# Patient Record
Sex: FEMALE | Race: BLACK OR AFRICAN AMERICAN | HISPANIC OR LATINO | URBAN - METROPOLITAN AREA
[De-identification: names, ages, dates, MRNs, and addresses within clinical notes are randomized per-mention and may not be internally consistent; named-entity substitution may affect disease eponyms.]

---

## 2020-01-17 ENCOUNTER — EMERGENCY (EMERGENCY)
Facility: HOSPITAL | Age: 35
LOS: 1 days | Discharge: HOME | End: 2020-01-17
Attending: EMERGENCY MEDICINE | Admitting: EMERGENCY MEDICINE
Payer: MEDICAID

## 2020-01-17 VITALS
HEART RATE: 64 BPM | RESPIRATION RATE: 18 BRPM | TEMPERATURE: 98 F | SYSTOLIC BLOOD PRESSURE: 156 MMHG | DIASTOLIC BLOOD PRESSURE: 81 MMHG | OXYGEN SATURATION: 100 %

## 2020-01-17 PROCEDURE — 99283 EMERGENCY DEPT VISIT LOW MDM: CPT

## 2020-01-17 PROCEDURE — 73620 X-RAY EXAM OF FOOT: CPT | Mod: 26,LT

## 2020-01-17 PROCEDURE — 99053 MED SERV 10PM-8AM 24 HR FAC: CPT

## 2020-01-17 RX ORDER — DIAZEPAM 5 MG
5 TABLET ORAL ONCE
Refills: 0 | Status: DISCONTINUED | OUTPATIENT
Start: 2020-01-17 | End: 2020-01-17

## 2020-01-17 RX ORDER — IBUPROFEN 200 MG
600 TABLET ORAL ONCE
Refills: 0 | Status: COMPLETED | OUTPATIENT
Start: 2020-01-17 | End: 2020-01-17

## 2020-01-17 RX ADMIN — Medication 600 MILLIGRAM(S): at 08:51

## 2020-01-17 RX ADMIN — Medication 5 MILLIGRAM(S): at 08:50

## 2020-01-17 RX ADMIN — Medication 600 MILLIGRAM(S): at 10:19

## 2020-01-17 NOTE — ED PROVIDER NOTE - MUSCULOSKELETAL MINIMAL EXAM
Midline neck and back nontender. No step offs. No crepitus. Pelvis stable. +b/l lumbosacral paraspinal muscle TTP and spasm.
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
## 2020-01-17 NOTE — ED PROVIDER NOTE - EXTREMITY EXAM
Lower extremities normal appearing. No TTP throughout R foot and R ankle. No L ankle TTP, however TTP over L heel. Strength 5/5 in all extremities. Gait intact but with obvious pain in L foot when walking.

## 2020-01-17 NOTE — ED PROVIDER NOTE - PROGRESS NOTE DETAILS
Kellie Tobar PGY1: Pt seen and evaluated by me. Pt with trapezius hypertonicity b/l with ttp, T/L paraspinal hypertonicity. no C/T/L midline ttp. ttp of the left heel, able to ambulate. no bruising or redness. Pending XR. Will reassess pain following medications.
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
## 2020-01-17 NOTE — ED PROVIDER NOTE - PATIENT PORTAL LINK FT
You can access the FollowMyHealth Patient Portal offered by [facility] by registering at the following website: [email or web address]. By joining [facility]’s FollowMyHealth portal, you will also be able to view your health information using other applications (apps) compatible with our system.
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
## 2020-01-17 NOTE — ED PROVIDER NOTE - NSFOLLOWUPINSTRUCTIONS_ED_ALL_ED_FT
you were seen in the emergency department today for neck pain and ankle pain.     you had an xray of your left foot done which didn't show any evidence of fracture.     You can take ibuprofen 600mg or tylenol 650mg every 6 hours at home for pain as needed.     follow up with your primary care doctor in the next sever days.     Return to the emergency department if you develop any new or worsening symptoms including but not limited to the following; severe/worsening pain, difficulty walking, numbness/tingling.

## 2020-03-08 ENCOUNTER — INPATIENT (INPATIENT)
Facility: HOSPITAL | Age: 35
LOS: 4 days | Discharge: HOME | End: 2020-03-13
Attending: PSYCHIATRY & NEUROLOGY | Admitting: PSYCHIATRY & NEUROLOGY
Payer: SELF-PAY

## 2020-03-08 VITALS
HEART RATE: 78 BPM | RESPIRATION RATE: 18 BRPM | TEMPERATURE: 98 F | DIASTOLIC BLOOD PRESSURE: 98 MMHG | OXYGEN SATURATION: 100 % | SYSTOLIC BLOOD PRESSURE: 148 MMHG

## 2020-03-08 LAB
ALBUMIN SERPL ELPH-MCNC: 4.3 G/DL — SIGNIFICANT CHANGE UP (ref 3.3–5)
ALP SERPL-CCNC: 56 U/L — SIGNIFICANT CHANGE UP (ref 40–120)
ALT FLD-CCNC: 24 U/L — SIGNIFICANT CHANGE UP (ref 4–33)
ANION GAP SERPL CALC-SCNC: 17 MMO/L — HIGH (ref 7–14)
APAP SERPL-MCNC: < 15 UG/ML — LOW (ref 15–25)
AST SERPL-CCNC: 23 U/L — SIGNIFICANT CHANGE UP (ref 4–32)
BASOPHILS # BLD AUTO: 0.05 K/UL — SIGNIFICANT CHANGE UP (ref 0–0.2)
BASOPHILS NFR BLD AUTO: 0.4 % — SIGNIFICANT CHANGE UP (ref 0–2)
BILIRUB SERPL-MCNC: 0.6 MG/DL — SIGNIFICANT CHANGE UP (ref 0.2–1.2)
BUN SERPL-MCNC: 12 MG/DL — SIGNIFICANT CHANGE UP (ref 7–23)
CALCIUM SERPL-MCNC: 9.7 MG/DL — SIGNIFICANT CHANGE UP (ref 8.4–10.5)
CHLORIDE SERPL-SCNC: 103 MMOL/L — SIGNIFICANT CHANGE UP (ref 98–107)
CO2 SERPL-SCNC: 20 MMOL/L — LOW (ref 22–31)
CREAT SERPL-MCNC: 0.83 MG/DL — SIGNIFICANT CHANGE UP (ref 0.5–1.3)
EOSINOPHIL # BLD AUTO: 0.04 K/UL — SIGNIFICANT CHANGE UP (ref 0–0.5)
EOSINOPHIL NFR BLD AUTO: 0.4 % — SIGNIFICANT CHANGE UP (ref 0–6)
ETHANOL BLD-MCNC: < 10 MG/DL — SIGNIFICANT CHANGE UP
GLUCOSE SERPL-MCNC: 82 MG/DL — SIGNIFICANT CHANGE UP (ref 70–99)
HCT VFR BLD CALC: 39.9 % — SIGNIFICANT CHANGE UP (ref 34.5–45)
HGB BLD-MCNC: 12.6 G/DL — SIGNIFICANT CHANGE UP (ref 11.5–15.5)
IMM GRANULOCYTES NFR BLD AUTO: 0.2 % — SIGNIFICANT CHANGE UP (ref 0–1.5)
LYMPHOCYTES # BLD AUTO: 2.25 K/UL — SIGNIFICANT CHANGE UP (ref 1–3.3)
LYMPHOCYTES # BLD AUTO: 20.1 % — SIGNIFICANT CHANGE UP (ref 13–44)
MCHC RBC-ENTMCNC: 25.5 PG — LOW (ref 27–34)
MCHC RBC-ENTMCNC: 31.6 % — LOW (ref 32–36)
MCV RBC AUTO: 80.6 FL — SIGNIFICANT CHANGE UP (ref 80–100)
MONOCYTES # BLD AUTO: 0.82 K/UL — SIGNIFICANT CHANGE UP (ref 0–0.9)
MONOCYTES NFR BLD AUTO: 7.3 % — SIGNIFICANT CHANGE UP (ref 2–14)
NEUTROPHILS # BLD AUTO: 8.03 K/UL — HIGH (ref 1.8–7.4)
NEUTROPHILS NFR BLD AUTO: 71.6 % — SIGNIFICANT CHANGE UP (ref 43–77)
NRBC # FLD: 0 K/UL — SIGNIFICANT CHANGE UP (ref 0–0)
PLATELET # BLD AUTO: 452 K/UL — HIGH (ref 150–400)
PMV BLD: 10.1 FL — SIGNIFICANT CHANGE UP (ref 7–13)
POTASSIUM SERPL-MCNC: 3.7 MMOL/L — SIGNIFICANT CHANGE UP (ref 3.5–5.3)
POTASSIUM SERPL-SCNC: 3.7 MMOL/L — SIGNIFICANT CHANGE UP (ref 3.5–5.3)
PROT SERPL-MCNC: 8.6 G/DL — HIGH (ref 6–8.3)
RBC # BLD: 4.95 M/UL — SIGNIFICANT CHANGE UP (ref 3.8–5.2)
RBC # FLD: 16.3 % — HIGH (ref 10.3–14.5)
SALICYLATES SERPL-MCNC: < 5 MG/DL — LOW (ref 15–30)
SODIUM SERPL-SCNC: 140 MMOL/L — SIGNIFICANT CHANGE UP (ref 135–145)
TSH SERPL-MCNC: 2.8 UIU/ML — SIGNIFICANT CHANGE UP (ref 0.27–4.2)
WBC # BLD: 11.21 K/UL — HIGH (ref 3.8–10.5)
WBC # FLD AUTO: 11.21 K/UL — HIGH (ref 3.8–10.5)

## 2020-03-08 PROCEDURE — 99285 EMERGENCY DEPT VISIT HI MDM: CPT

## 2020-03-08 NOTE — ED BEHAVIORAL HEALTH ASSESSMENT NOTE - SUMMARY
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 No drug hx, no etoh use or tobacco use, no hx of violence or arrests. Pt BIB by EMS for bizarre behavior and confusion. 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 No drug hx, no etoh use or tobacco use, no hx of violence or arrests. Pt BIB by EMS for bizarre behavior and confusion.    Patient presents with berna with psychotic sx. Pt has poor insight into her symptoms and has been exercising poor judgement  and not able to care for herself , due to acute manic and psychotic sx . Pt requires inpatient hospitalization for stabilization and safety concerns.

## 2020-03-08 NOTE — ED BEHAVIORAL HEALTH ASSESSMENT NOTE - SUICIDE PROTECTIVE FACTORS
Engaged in work or school/Responsibility to family and others/Identifies reasons for living/Has future plans/Supportive social network of family or friends

## 2020-03-08 NOTE — ED BEHAVIORAL HEALTH ASSESSMENT NOTE - DESCRIPTION
calm, cooperative  Vital Signs Last 24 Hrs  T(C): 36.4 (08 Mar 2020 19:42), Max: 36.4 (08 Mar 2020 19:42)  T(F): 97.5 (08 Mar 2020 19:42), Max: 97.5 (08 Mar 2020 19:42)  HR: 78 (08 Mar 2020 19:42) (78 - 78)  BP: 148/98 (08 Mar 2020 19:42) (148/98 - 148/98)  BP(mean): --  RR: 18 (08 Mar 2020 19:42) (18 - 18)  SpO2: 100% (08 Mar 2020 19:42) (100% - 100%) as as per hpi

## 2020-03-08 NOTE — ED ADULT TRIAGE NOTE - CHIEF COMPLAINT QUOTE
pt arrives ems who [facility] called by bystander who stated pt was lying on top of car. pt arrives with inappropriate responses. stating " I was doing yoga,whats wrong with that. ". gives incorrect date with laughing.  fs 78 by ems pt arrives ems who pt was [facility] called by bystander who stated pt was lying on top of car. pt arrives with inappropriate responses. stating " I was doing yoga,whats wrong with that. ". gives incorrect date with laughing.  fs 78 by ems  fs 81 on arrival pt arrives ems who pt was found standing In front of [employer] ringing bell. called by bystander who stated pt was lying on top of car. pt arrives with inappropriate responses. stating " I was doing yoga,whats wrong with that. ". gives incorrect date with laughing.  fs 78 by ems  fs 81 on arrival . fit to evaluate. for

## 2020-03-08 NOTE — ED ADULT NURSE NOTE - OBJECTIVE STATEMENT
Pt belongings checked and secured by staff.  Pt checked by metal detector.  Pt changed into gown and scrubs.  Pt brought in to ED found sitting on a strangers car in front of a [employer] in her pajamas "doing yoga."  Pt believes it is 1985.  Pt is cooperative with staff and polite in her exchanges.  Labs drawn and sent.  Pt yet to provide urine.  Pt denies S/I, H/I.  Pt awaiting Psych consult.

## 2020-03-08 NOTE — ED ADULT NURSE NOTE - CHIEF COMPLAINT QUOTE
pt arrives ems who pt was found standing In front of [employer] ringing bell. called by bystander who stated pt was lying on top of car. pt arrives with inappropriate responses. stating " I was doing yoga,whats wrong with that. ". gives incorrect date with laughing.  fs 78 by ems  fs 81 on arrival . fit to evaluate. for

## 2020-03-08 NOTE — ED PROVIDER NOTE - NS ED ROS FT
Constitutional: No fever. no chills.   Eyes: No visual changes  HEENT: No throat pain  CV: No chest pain, no palpitations  Resp: No shortness of breath, no cough  GI: No abdominal pain. No nausea. no  vomiting.   : No dysuria, hematuria.   MSK: No musculoskeletal pain  Skin: No rash  Neuro: No headache. No numbness or tingling. No weakness.   Allergy/Immunology: no allergy to medicine or food.

## 2020-03-08 NOTE — ED BEHAVIORAL HEALTH ASSESSMENT NOTE - HPI (INCLUDE ILLNESS QUALITY, SEVERITY, DURATION, TIMING, CONTEXT, MODIFYING FACTORS, ASSOCIATED SIGNS AND SYMPTOMS)
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 No drug hx, no etoh use or tobacco use, no hx of violence or arrests. Pt BIB by EMS for bizarre behavior and confusion.    On assessment, pt is calm cooperative, did not require any prns in ED. Patient  is oriented to self only, reports she is in" heaven" and reports today's date as 1985. When the writer points out the date she endorses is her birthday she states her birthday just passed in February and it was on Valentine's day, she also states today is Friday.  When informing that it's Sunday she is surprised  stating "wow , I've been sleeping for a while ". However later reports poor sleep , but also says she has been sleeping 30 hrs. She reports living with her mother and her triplet sisters and her father upstairs (this is not the case , pt only has one sister , Lakshmi and the father is estranged from the family). Pt reports she was going for a walk today because 'it gives me clarity ", when one of the neighbors thought it was "peculiar " and called 911. Patient is religiously preoccupied endorsing "God had given me a talent", and that she has been going out and thinking how she cant put this talent to purpose . She states she has a lot of creative ideas and has been spending  a lot fo time in her art studio , "I see different colors, and I have a lot of creative ideas" . She says she came up with the conclusion that she wants to be a mother and have met this person whom she claims she will build life with. She also states she has introduced him to the family and that was a big deal (as per the sister , pt is not involved with anyone and has not brought anyone home ). She refers to herself as the child of Gd, and that she has been communicating to him directly and he has informed her "that I am enough to be his daughter". "Gd is everywhere, in me, in trees ." She reports she has been hearing the voice of Asiya Sotelo, telling her she is enough to be  Gd;s daughter".  She denies hearing any other voices . Denies vh.     She describes her mood as "calm", denies feeling depressed or anxious. She is endorsing low energy level "because my feet are hurting". She denies any issues with concentration, denies feeling hopeless, deneis any si/hi/i/p. She  is reporting she is thinking a lot , having racing thoughts, having decrease need for sleep. She denies excessive/increased  spending money , denies impulsivity.     as per the sister, Lakshmi [phone]. pt lives the mother . The sister reports she was not aware pt is in the hospital. Patient has no psychiatric hx , never been hospitalizations, never treated .Pt left the house at 4:00am today and has been missing since.  Pt has been having episodes of confusion started this past Tuesday, lost her wallet, lost her car keys . Patient has been forgetful , would say things that did something but she did not, for example says she put a cup in the sink , but there is no cup in the sink . She has been saying that she needs to meditate and go out late at night and returns by morning time . She has been leaving the house in the middle of the night this week  and not returning until morning. Pt has been wondering the streets of the neighborhood. Pt explained to her sister, "I need me time" , and has expressed to her sister in the past month that she feels lost, and feels she does not know what direction to go. The sister noticed that pt has been having on and off mood swings "high and lows".  Patient has not been sleeping well, up all night and during the day. Pt's appetite has been low, and she lost 10lbs . Patient has been neglectful with the dogs, have not been taking care of them .  The sister denies  pt expressed any si recently or int he past denies any psychotic sx .Pt's aunts passed away recently ,  who was very close to the pt and she suspects maybe it has affected the patient.  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 No family hx of psychiatric illness. As per the sister pt does not use drugs or smokes cigarettes, and denies pt drinks ETOH . Pt graduated from [employer] this semester , studied criminal justice . No possessions of guns, no hx of aggression or violence.

## 2020-03-08 NOTE — ED BEHAVIORAL HEALTH ASSESSMENT NOTE - RISK ASSESSMENT
Low Acute Suicide Risk pt is at elevated risk given acute mood sx and psychotic sx. Risk factors include active mood sx, active psychotic sx, , not in treatment. Risk factors will be mitigated by hospitalizations.  Patient has protective factors including family support, no hx of sa. no substance use.

## 2020-03-08 NOTE — ED BEHAVIORAL HEALTH ASSESSMENT NOTE - PSYCHIATRIC ISSUES AND PLAN (INCLUDE STANDING AND PRN MEDICATION)
start Abilify 5mg start Abilify 5mg qam and Klonopin 1mg qhs . For agitation : Haldol 2mg and Ativan 2mg po/im q6hrs prn

## 2020-03-08 NOTE — ED PROVIDER NOTE - OBJECTIVE STATEMENT
35 yo F no known PMH BIBEMS after bystanders called that she was acting odd on top of a parked car. Patient reports was doing "downward dog" and praying in front of the [employer] near her house. Patient noted not to have shoes or socks states she walked from her house to [employer] without any shoes. patient denies si, hi. patient denies drugs, etoh use.  pt gave number for mother Lindsay Rojas [phone], but number not working.

## 2020-03-08 NOTE — ED PROVIDER NOTE - CLINICAL SUMMARY MEDICAL DECISION MAKING FREE TEXT BOX
abnormal behavior aaox1. no reoprted psychosis in past visits in EMR. pt denies substance abuse. possible new psychosis. plan: labs psych eval.

## 2020-03-08 NOTE — ED PROVIDER NOTE - PHYSICAL EXAMINATION
GEN: no acute respiratory distress. nontoxic, speaking comfortably in full sentences, ambulating with steady gait.  HEENT: NCAT. face symmetrical. PERRL 4mm, EOMI, MMM, oropharynx wnl.  Neck: no JVD, trachea midline, no LAD, FROM  CV: RRR. +S1S2, no murmur. cap refill <2 sec  Chest: CTA B/l. no wheezing, rales, rhonchi. no retractions. good air movement.   ABD: +BS, soft, non distended, non tender.   : no cva or suprapubic tenderness  MSK: No clubbing, cyanosis, edema. FROM of all extremities. no tenderness to palpation. No midline or paraspinal tenderness.   Neuro: AAOX1 (patient believes she is in a [place] and it is 1985).Sensation intact, motor 5/5 throughout. no ataxia  SKIN: No rash

## 2020-03-08 NOTE — ED BEHAVIORAL HEALTH NOTE - BEHAVIORAL HEALTH NOTE
as per the sister, Lakshmi Villatoro [phone]. pt lives the mother . The sister reports she was not aware pt is in the hospital. Patient has no psychiatric hx , never been hospitalizations, never treated .Pt left the house at 4:00am today and has been missing since.  Pt has been having episodes of confusion started this past Tuesday, lost her wallet, lost her car keys . Patient has been forgetful , would say things that did something but she did not, for example says she put a cup in the sink , but there is no cup in the sink . She has been saying that she needs to meditate and go out late at night and returns by morning time . She has been leaving the house in the middle of the night this week  and not returning until morning. Pt has been wondering the streets of the neighborhood. Pt explained to her sister, "I need me time" , and has expressed to her sister in the past month that she feels lost, and feels she does not know what direction to go. The sister noticed that pt has been having on and off mood swings "high and lows".  Patient has not been sleeping well, up all night and during the day. Pt's appetite has been low, and she lost 10lbs . Patient has been neglectful with the dogs, have not been taking care of them .  The sister denies  pt expressed any si recently or int he past denies any psychotic sx .Pt's aunts passed away recently ,  who was very close to the pt and she suspects maybe it has affected the patient.  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 No family hx of psychiatric illness. As per the sister pt does not use drugs or smokes cigarettes, and denies pt drinks ETOH . Pt graduated from [employer] this semester , studied criminal justice . No possessions of guns, no hx of aggression or violence.

## 2020-03-09 DIAGNOSIS — F31.2 BIPOLAR DISORDER, CURRENT EPISODE MANIC SEVERE WITH PSYCHOTIC FEATURES: ICD-10-CM

## 2020-03-09 DIAGNOSIS — R46.89 OTHER SYMPTOMS AND SIGNS INVOLVING APPEARANCE AND BEHAVIOR: ICD-10-CM

## 2020-03-09 LAB
AMPHET UR-MCNC: NEGATIVE — SIGNIFICANT CHANGE UP
APPEARANCE UR: SIGNIFICANT CHANGE UP
BACTERIA # UR AUTO: SIGNIFICANT CHANGE UP
BARBITURATES UR SCN-MCNC: NEGATIVE — SIGNIFICANT CHANGE UP
BENZODIAZ UR-MCNC: NEGATIVE — SIGNIFICANT CHANGE UP
BILIRUB UR-MCNC: NEGATIVE — SIGNIFICANT CHANGE UP
BLOOD UR QL VISUAL: SIGNIFICANT CHANGE UP
CANNABINOIDS UR-MCNC: NEGATIVE — SIGNIFICANT CHANGE UP
COCAINE METAB.OTHER UR-MCNC: NEGATIVE — SIGNIFICANT CHANGE UP
COLOR SPEC: YELLOW — SIGNIFICANT CHANGE UP
GLUCOSE UR-MCNC: NEGATIVE — SIGNIFICANT CHANGE UP
KETONES UR-MCNC: HIGH
LEUKOCYTE ESTERASE UR-ACNC: NEGATIVE — SIGNIFICANT CHANGE UP
METHADONE UR-MCNC: NEGATIVE — SIGNIFICANT CHANGE UP
NITRITE UR-MCNC: NEGATIVE — SIGNIFICANT CHANGE UP
OPIATES UR-MCNC: NEGATIVE — SIGNIFICANT CHANGE UP
OXYCODONE UR-MCNC: NEGATIVE — SIGNIFICANT CHANGE UP
PCP UR-MCNC: NEGATIVE — SIGNIFICANT CHANGE UP
PH UR: 5.5 — SIGNIFICANT CHANGE UP (ref 5–8)
PROT UR-MCNC: 20 — SIGNIFICANT CHANGE UP
RBC CASTS # UR COMP ASSIST: SIGNIFICANT CHANGE UP (ref 0–?)
SP GR SPEC: 1.02 — SIGNIFICANT CHANGE UP (ref 1–1.04)
SQUAMOUS # UR AUTO: SIGNIFICANT CHANGE UP
UROBILINOGEN FLD QL: NORMAL — SIGNIFICANT CHANGE UP
WBC UR QL: SIGNIFICANT CHANGE UP (ref 0–?)

## 2020-03-09 PROCEDURE — 99222 1ST HOSP IP/OBS MODERATE 55: CPT

## 2020-03-09 PROCEDURE — 70450 CT HEAD/BRAIN W/O DYE: CPT | Mod: 26

## 2020-03-09 RX ORDER — ARIPIPRAZOLE 15 MG/1
5 TABLET ORAL DAILY
Refills: 0 | Status: DISCONTINUED | OUTPATIENT
Start: 2020-03-09 | End: 2020-03-09

## 2020-03-09 RX ORDER — ARIPIPRAZOLE 15 MG/1
5 TABLET ORAL AT BEDTIME
Refills: 0 | Status: DISCONTINUED | OUTPATIENT
Start: 2020-03-09 | End: 2020-03-13

## 2020-03-09 RX ORDER — DIPHENHYDRAMINE HCL 50 MG
50 CAPSULE ORAL EVERY 6 HOURS
Refills: 0 | Status: DISCONTINUED | OUTPATIENT
Start: 2020-03-09 | End: 2020-03-13

## 2020-03-09 RX ORDER — HALOPERIDOL DECANOATE 100 MG/ML
2 INJECTION INTRAMUSCULAR EVERY 6 HOURS
Refills: 0 | Status: DISCONTINUED | OUTPATIENT
Start: 2020-03-09 | End: 2020-03-13

## 2020-03-09 RX ORDER — CLONAZEPAM 1 MG
1 TABLET ORAL AT BEDTIME
Refills: 0 | Status: DISCONTINUED | OUTPATIENT
Start: 2020-03-09 | End: 2020-03-13

## 2020-03-09 RX ADMIN — HALOPERIDOL DECANOATE 2 MILLIGRAM(S): 100 INJECTION INTRAMUSCULAR at 20:24

## 2020-03-09 RX ADMIN — HALOPERIDOL DECANOATE 2 MILLIGRAM(S): 100 INJECTION INTRAMUSCULAR at 06:09

## 2020-03-09 RX ADMIN — ARIPIPRAZOLE 5 MILLIGRAM(S): 15 TABLET ORAL at 20:23

## 2020-03-09 RX ADMIN — Medication 1 MILLIGRAM(S): at 20:23

## 2020-03-09 RX ADMIN — Medication 50 MILLIGRAM(S): at 20:24

## 2020-03-09 RX ADMIN — Medication 2 MILLIGRAM(S): at 06:09

## 2020-03-09 NOTE — ED BEHAVIORAL HEALTH NOTE - BEHAVIORAL HEALTH NOTE
FREDO notified that pt is being admitted to the hospital; assigned to [facility].  FREDO contacted pt's sister, Lakshmi, and informed her that pt is going to [facility]; FREDO provided pt's sister with contact number to the unit.

## 2020-03-10 PROCEDURE — 99232 SBSQ HOSP IP/OBS MODERATE 35: CPT

## 2020-03-10 RX ADMIN — ARIPIPRAZOLE 5 MILLIGRAM(S): 15 TABLET ORAL at 20:49

## 2020-03-10 RX ADMIN — Medication 1 MILLIGRAM(S): at 20:49

## 2020-03-11 PROCEDURE — 99232 SBSQ HOSP IP/OBS MODERATE 35: CPT

## 2020-03-11 RX ADMIN — Medication 1 MILLIGRAM(S): at 20:28

## 2020-03-11 RX ADMIN — ARIPIPRAZOLE 5 MILLIGRAM(S): 15 TABLET ORAL at 20:28

## 2020-03-12 PROCEDURE — 99232 SBSQ HOSP IP/OBS MODERATE 35: CPT

## 2020-03-12 RX ADMIN — Medication 1 MILLIGRAM(S): at 20:59

## 2020-03-12 RX ADMIN — ARIPIPRAZOLE 5 MILLIGRAM(S): 15 TABLET ORAL at 20:59

## 2020-03-13 VITALS — HEART RATE: 76 BPM | DIASTOLIC BLOOD PRESSURE: 71 MMHG | SYSTOLIC BLOOD PRESSURE: 123 MMHG | TEMPERATURE: 99 F

## 2020-03-13 PROCEDURE — 99238 HOSP IP/OBS DSCHRG MGMT 30/<: CPT

## 2020-03-13 RX ORDER — ARIPIPRAZOLE 15 MG/1
1 TABLET ORAL
Qty: 15 | Refills: 0
Start: 2020-03-13

## 2021-03-12 ENCOUNTER — INPATIENT (INPATIENT)
Facility: HOSPITAL | Age: 36
LOS: 6 days | Discharge: ANOTHER HOSPITAL | End: 2021-03-19
Attending: STUDENT IN AN ORGANIZED HEALTH CARE EDUCATION/TRAINING PROGRAM | Admitting: STUDENT IN AN ORGANIZED HEALTH CARE EDUCATION/TRAINING PROGRAM
Payer: MEDICAID

## 2021-03-12 VITALS — HEIGHT: 62 IN

## 2021-03-12 LAB
ALBUMIN SERPL ELPH-MCNC: 4.1 G/DL — SIGNIFICANT CHANGE UP (ref 3.3–5)
ALP SERPL-CCNC: 63 U/L — SIGNIFICANT CHANGE UP (ref 40–120)
ALT FLD-CCNC: 20 U/L — SIGNIFICANT CHANGE UP (ref 4–33)
ANION GAP SERPL CALC-SCNC: 12 MMOL/L — SIGNIFICANT CHANGE UP (ref 7–14)
APPEARANCE UR: CLEAR — SIGNIFICANT CHANGE UP
AST SERPL-CCNC: 22 U/L — SIGNIFICANT CHANGE UP (ref 4–32)
BASOPHILS # BLD AUTO: 0.03 K/UL — SIGNIFICANT CHANGE UP (ref 0–0.2)
BASOPHILS NFR BLD AUTO: 0.3 % — SIGNIFICANT CHANGE UP (ref 0–2)
BILIRUB SERPL-MCNC: 0.4 MG/DL — SIGNIFICANT CHANGE UP (ref 0.2–1.2)
BILIRUB UR-MCNC: NEGATIVE — SIGNIFICANT CHANGE UP
BUN SERPL-MCNC: 11 MG/DL — SIGNIFICANT CHANGE UP (ref 7–23)
CALCIUM SERPL-MCNC: 9.4 MG/DL — SIGNIFICANT CHANGE UP (ref 8.4–10.5)
CHLORIDE SERPL-SCNC: 103 MMOL/L — SIGNIFICANT CHANGE UP (ref 98–107)
CO2 SERPL-SCNC: 22 MMOL/L — SIGNIFICANT CHANGE UP (ref 22–31)
COLOR SPEC: SIGNIFICANT CHANGE UP
CREAT SERPL-MCNC: 0.86 MG/DL — SIGNIFICANT CHANGE UP (ref 0.5–1.3)
DIFF PNL FLD: ABNORMAL
EOSINOPHIL # BLD AUTO: 0.08 K/UL — SIGNIFICANT CHANGE UP (ref 0–0.5)
EOSINOPHIL NFR BLD AUTO: 0.8 % — SIGNIFICANT CHANGE UP (ref 0–6)
GLUCOSE SERPL-MCNC: 93 MG/DL — SIGNIFICANT CHANGE UP (ref 70–99)
GLUCOSE UR QL: NEGATIVE — SIGNIFICANT CHANGE UP
HCG SERPL-ACNC: <5 MIU/ML — SIGNIFICANT CHANGE UP
HCT VFR BLD CALC: 36.4 % — SIGNIFICANT CHANGE UP (ref 34.5–45)
HGB BLD-MCNC: 11.6 G/DL — SIGNIFICANT CHANGE UP (ref 11.5–15.5)
IANC: 7.48 K/UL — SIGNIFICANT CHANGE UP (ref 1.5–8.5)
IMM GRANULOCYTES NFR BLD AUTO: 0.3 % — SIGNIFICANT CHANGE UP (ref 0–1.5)
KETONES UR-MCNC: ABNORMAL
LEUKOCYTE ESTERASE UR-ACNC: NEGATIVE — SIGNIFICANT CHANGE UP
LYMPHOCYTES # BLD AUTO: 19.3 % — SIGNIFICANT CHANGE UP (ref 13–44)
LYMPHOCYTES # BLD AUTO: 2.01 K/UL — SIGNIFICANT CHANGE UP (ref 1–3.3)
MCHC RBC-ENTMCNC: 25.4 PG — LOW (ref 27–34)
MCHC RBC-ENTMCNC: 31.9 GM/DL — LOW (ref 32–36)
MCV RBC AUTO: 79.6 FL — LOW (ref 80–100)
MONOCYTES # BLD AUTO: 0.81 K/UL — SIGNIFICANT CHANGE UP (ref 0–0.9)
MONOCYTES NFR BLD AUTO: 7.8 % — SIGNIFICANT CHANGE UP (ref 2–14)
NEUTROPHILS # BLD AUTO: 7.48 K/UL — HIGH (ref 1.8–7.4)
NEUTROPHILS NFR BLD AUTO: 71.5 % — SIGNIFICANT CHANGE UP (ref 43–77)
NITRITE UR-MCNC: NEGATIVE — SIGNIFICANT CHANGE UP
NRBC # BLD: 0 /100 WBCS — SIGNIFICANT CHANGE UP
NRBC # FLD: 0 K/UL — SIGNIFICANT CHANGE UP
PCP SPEC-MCNC: SIGNIFICANT CHANGE UP
PH UR: 5.5 — SIGNIFICANT CHANGE UP (ref 5–8)
PLATELET # BLD AUTO: 375 K/UL — SIGNIFICANT CHANGE UP (ref 150–400)
POTASSIUM SERPL-MCNC: 3.5 MMOL/L — SIGNIFICANT CHANGE UP (ref 3.5–5.3)
POTASSIUM SERPL-SCNC: 3.5 MMOL/L — SIGNIFICANT CHANGE UP (ref 3.5–5.3)
PROT SERPL-MCNC: 7.8 G/DL — SIGNIFICANT CHANGE UP (ref 6–8.3)
PROT UR-MCNC: ABNORMAL
RBC # BLD: 4.57 M/UL — SIGNIFICANT CHANGE UP (ref 3.8–5.2)
RBC # FLD: 16.6 % — HIGH (ref 10.3–14.5)
SARS-COV-2 RNA SPEC QL NAA+PROBE: SIGNIFICANT CHANGE UP
SODIUM SERPL-SCNC: 137 MMOL/L — SIGNIFICANT CHANGE UP (ref 135–145)
SP GR SPEC: 1.02 — SIGNIFICANT CHANGE UP (ref 1.01–1.02)
TOXICOLOGY SCREEN, DRUGS OF ABUSE, SERUM RESULT: SIGNIFICANT CHANGE UP
TSH SERPL-MCNC: 3.86 UIU/ML — SIGNIFICANT CHANGE UP (ref 0.27–4.2)
UROBILINOGEN FLD QL: SIGNIFICANT CHANGE UP
WBC # BLD: 10.44 K/UL — SIGNIFICANT CHANGE UP (ref 3.8–10.5)
WBC # FLD AUTO: 10.44 K/UL — SIGNIFICANT CHANGE UP (ref 3.8–10.5)

## 2021-03-12 PROCEDURE — 99285 EMERGENCY DEPT VISIT HI MDM: CPT

## 2021-03-12 RX ORDER — HALOPERIDOL DECANOATE 100 MG/ML
5 INJECTION INTRAMUSCULAR ONCE
Refills: 0 | Status: COMPLETED | OUTPATIENT
Start: 2021-03-12 | End: 2021-03-12

## 2021-03-12 RX ORDER — ARIPIPRAZOLE 15 MG/1
5 TABLET ORAL DAILY
Refills: 0 | Status: DISCONTINUED | OUTPATIENT
Start: 2021-03-12 | End: 2021-03-13

## 2021-03-12 RX ADMIN — Medication 2 MILLIGRAM(S): at 14:59

## 2021-03-12 RX ADMIN — ARIPIPRAZOLE 5 MILLIGRAM(S): 15 TABLET ORAL at 23:21

## 2021-03-12 RX ADMIN — HALOPERIDOL DECANOATE 5 MILLIGRAM(S): 100 INJECTION INTRAMUSCULAR at 14:59

## 2021-03-12 NOTE — ED BEHAVIORAL HEALTH ASSESSMENT NOTE - PSYCHIATRIC ISSUES AND PLAN (INCLUDE STANDING AND PRN MEDICATION)
start Abilify 5mg qam and Klonopin 1mg qhs . For agitation : Haldol 2mg and Ativan 2mg po/im q6hrs prn
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
## 2021-03-12 NOTE — ED PROVIDER NOTE - CLINICAL SUMMARY MEDICAL DECISION MAKING FREE TEXT BOX
Labs, Urine Tox/UA, EKG  Medical evaluation performed. There is no clinical evidence of intoxication or any acute medical problem requiring immediate intervention. Patient is awaiting psychiatric consultation. Final disposition will be determined by psychiatrist. Labs, Urine Tox/UA, EKG  Medical evaluation performed. There is no clinical evidence of intoxication or any acute medical problem requiring immediate intervention. Patient is awaiting psychiatric consultation. Final disposition will be determined by psychiatrist. Admit to inpatient psychiatry.

## 2021-03-12 NOTE — ED BEHAVIORAL HEALTH NOTE - BEHAVIORAL HEALTH NOTE
Attempted to see patient who is currently sedated. Received Ativan 2 mg Haldol 5 mg IM for agitation at 2:45p. Patient in police custody at this time.

## 2021-03-12 NOTE — ED BEHAVIORAL HEALTH ASSESSMENT NOTE - CASE SUMMARY
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 , with pmhx: PCOS, with no psychiatric hx,  hx of 1 prior hospitalizations ( [facility] 3/9-3/13/20) not in current treatment,  no known hx of si/sa, no known h/o drug abuse or complicated withdrawal, no known hx of violence,  Pt BIB by [employer] under arrest for trespassing. Upon arrival to ED patient was agitated and required Ativan 2 mg Haldol 5 mg Im. Patient slept for several hours after receiving medication. She was later interviewed in a private setting. Patient was seen and assessed, discussed elements of hpi/ros/mse with NP, Janki Jaimes and agree with the above assessment. 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 Upon arrival to ED patient was agitated and required Ativan 2 mg Haldol 5 mg Im. Patient slept for several hours after receiving medication. She was later interviewed in a private setting.    On assessment several hrs after initial presentation, pt is bizarre in behavior , standing in front of the bed with the eyes closed . The writer asked that pt sits or lays down on the bed to avoid falling , her response "I need to go home". Patient is not able to give a plausible/rational explanation as to why she broke into someone else's house . She is minimizing and denies  psychiatric symptoms. However based on collateral  information , recent behavior pt with impaired reasoning , poor judgement and inability to care for herself due to acute manic sx , requires psychiatric admission for safety and stabilization. Patient was seen and assessed, discussed elements of hpi/ros/mse with NP, Janki Jaimes and agree with the above assessment. 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 3/9-3/13/20), not in current treatment,  no known hx of si/sa, no known h/o drug abuse or complicated withdrawal, no known hx of violence,  Pt BIB by [employer] under arrest for trespassing. Upon arrival to ED patient was agitated and required Ativan 2 mg Haldol 5 mg Im. Patient slept for several hours after receiving medication. She was later interviewed in a private setting.    On assessment several hrs after initial presentation, pt is bizarre in behavior , standing in front of the bed with the eyes closed . The writer asked that pt sits or lays down on the bed to avoid falling , her response "I need to go home". Patient is not able to give a plausible/rational explanation as to why she broke into someone else's house . She is minimizing and denies  psychiatric symptoms. However based on collateral  information , recent behavior pt with impaired reasoning , poor judgement and inability to care for herself due to acute manic sx , requires psychiatric admission for safety and stabilization.

## 2021-03-12 NOTE — ED BEHAVIORAL HEALTH ASSESSMENT NOTE - RISK ASSESSMENT
pt is at elevated risk given acute mood sx and psychotic sx. Risk factors include active mood sx, active psychotic sx, , not in treatment. Risk factors will be mitigated by hospitalizations.  Patient has protective factors including family support, no hx of sa. no substance use. High Acute Suicide Risk
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
## 2021-03-12 NOTE — ED BEHAVIORAL HEALTH NOTE - BEHAVIORAL HEALTH NOTE
Writer contacted pt’s sister, Lakshmi Urbina, at [phone]. Writer able to reach sister at 3rd attempt. Pt’s sister and brother in law (Beckie) provided the following information:     Sister unaware pt was brought to hospital. Pt was said to have left home last night at 1am. Pt resides with mother. Pt is unemployed with no dependents. Sister says pt was never formally diagnosed with mental illness. No medical hx. Pt has had episodes of confusion and walking away with 2 prior psychiatric hospitalizations. Pt last hospitalized in Oct. 2020 by [facility] with hospital name unknown. Incident prior was said to be pt walking streets franticly with no shoes on. Sister last saw pt on Monday. Pt on Monday was said to be pleasant. Pt has no current outpatient treatment and not taking any medication. Pt was prescribed Abilify but noncompliant.     Pt left home last night at 1am with family not hearing anything after. Pt was also said to have left her home in middle of night the last 2-3 nights. Similar behavior to past episodes. Unaware of where pt is going during the night. Sister reports that pt has not been eating. Pt has not been sleeping. Sister reports no sleep or appetite going on for 2 weeks. Pt is showering and tending to ADLs.     Pt said to recently be more preoccupied with cleaning and getting rid of things. Pt said to have increased energy. No AH/VH reported. Pt said to be thinking that people don’t love her and that people are trying to bring her down. No SI/HI intent or plan. No past attempts. No violence or physical aggression. Pt said to be recently verbally aggressive with comments or suggestions said to be behavior when decompensating. No substance use reported.  No access to firearms. No hx of trauma. No known exposure to COVID or travel outside of [place]. Pt at baseline is calm, social and talkative.

## 2021-03-12 NOTE — ED BEHAVIORAL HEALTH ASSESSMENT NOTE - SUMMARY
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 Upon arrival to ED patient was agitated and required Ativan 2 mg Haldol 5 mg Im. Patient slept for several hours after receiving medication. She was later interviewed in a private setting.     Patient presents with berna with psychotic symptoms. She is paranoid and her thought process is disorganized. Patient speech is loud, rapid and pressured. Collateral from family supports she has not been sleeping and acting bizarre. Patient Pt has poor insight into her symptoms and has been exercising poor judgement. She is not able to care for herself due to acute manic and psychotic sx and requires inpatient hospitalization for stabilization and safety. Current presentation does not appear to be caused by substance intoxication or withdrawal as utox resulted negative and collateral supports patient does not have any h/o substance abuse. Patient is currently under arrest and is pending forensic admission. She will require transfer to a forensic unit. Will start patient on Ability 5 mg to target psychosis and continue with Ativan 2 mg Haldol 5 mg im for agitation. 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 Upon arrival to ED patient was agitated and required Ativan 2 mg Haldol 5 mg Im. Patient slept for several hours after receiving medication. She was later interviewed in a private setting.     Patient presents with berna with psychotic symptoms. She is paranoid and her thought process is disorganized. Patient speech is loud, rapid and pressured. Collateral from family supports she has not been sleeping and acting bizarre. Patient Pt has poor insight into her symptoms and has been exercising poor judgement. She is not able to care for herself due to acute manic and psychotic sx and requires inpatient hospitalization for stabilization and safety. Current presentation does not appear to be caused by substance intoxication or withdrawal as utox resulted negative and collateral supports patient does not have any h/o substance abuse. Patient is currently under arrest and is pending forensic admission. She will require transfer to a forensic unit. Will start patient on Ability 5 mg to target psychosis and continue with Ativan 2 mg Haldol 5 mg im for agitation.

## 2021-03-12 NOTE — ED ADULT NURSE NOTE - OBJECTIVE STATEMENT
Pt arrived to  in handcuffs under arrest for entering someone else's home and taking shower. Pt arrived to  without clothes. Pt uncooperative and disorganized during intake. Pt medicated as ordered. Pt changed into  clothing. Personal property collected and logged.

## 2021-03-12 NOTE — ED ADULT NURSE NOTE - CHIEF COMPLAINT QUOTE
Pt brought in by EMS and [facility] in handcuffs under arrest for trespassing. As per EMS pt broke into house and was taking a shower. Pt has no clothes, wrapped in sheets. Pt to be brought back to  to get dressed. Pt denies psych history.
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
## 2021-03-12 NOTE — ED ADULT TRIAGE NOTE - CHIEF COMPLAINT QUOTE
Pt brought in by EMS and [facility] in handcuffs under arrest for trespassing. As per EMS pt broke into house and was taking a shower. Pt has no clothes, wrapped in sheets. Pt to be brought back to  to get dressed. Pt denies psych history.

## 2021-03-12 NOTE — ED PROVIDER NOTE - PROGRESS NOTE DETAILS
JESUS: I was signed out this pt pending admission to [facility] in as pt is psychosis under arrest. No complaints, sleeping. Will continue to monitor until transfer/admission. JESUS: No complaints, Sleeping. will continue to monitor. DD ED ATTG:  Rec's signout from Dr Yanez  35F bipolar, found in someone else's shower, pt also manic and psychotic.  Needs admin approval here to admit, medically.  No female forensic psych beds.  to be d/w AM psych SW.  Pt still psychotic.  Upon reassment pt says she has a migraine, but is ambulatory in no distress.  No forensic beds available at [facility].  Will admit to medicine.

## 2021-03-12 NOTE — ED ADULT NURSE REASSESSMENT NOTE - NS ED NURSE REASSESS COMMENT FT1
Pt remains calm and cooperative at this time. Leg shackles intact, R arm handcuffed to the stretcher.  at bedside. Comfort measures provided.

## 2021-03-12 NOTE — ED BEHAVIORAL HEALTH ASSESSMENT NOTE - DESCRIPTION
upon arrival to ED was agitated and required Ativan 2 mg Haldol 5 mg im. Patient slept for several hours and was cooperative during interview.   ICU Vital Signs Last 24 Hrs  T(C): 36.7 (12 Mar 2021 20:00), Max: 36.7 (12 Mar 2021 15:39)  T(F): 98 (12 Mar 2021 20:00), Max: 98 (12 Mar 2021 15:39)  HR: 79 (12 Mar 2021 20:00) (55 - 79)  BP: 119/57 (12 Mar 2021 20:00) (106/55 - 119/57)  BP(mean): --  ABP: --  ABP(mean): --  RR: 18 (12 Mar 2021 20:00) (16 - 18)  SpO2: 100% (12 Mar 2021 20:00) (100% - 100%) denies Single, no children, lives with mother, supportive sister, college graduate from [employer].

## 2021-03-12 NOTE — ED ADULT NURSE REASSESSMENT NOTE - NS ED NURSE REASSESS COMMENT FT1
Pt calm and cooperative at this time, R arm handcuffed to stretcher with police presence at bedside. Vitals taken and recorded, labs drawn, covid swab completed.

## 2021-03-13 PROCEDURE — 71045 X-RAY EXAM CHEST 1 VIEW: CPT | Mod: 26

## 2021-03-13 RX ORDER — ARIPIPRAZOLE 15 MG/1
10 TABLET ORAL DAILY
Refills: 0 | Status: DISCONTINUED | OUTPATIENT
Start: 2021-03-13 | End: 2021-03-19

## 2021-03-13 RX ORDER — HALOPERIDOL DECANOATE 100 MG/ML
5 INJECTION INTRAMUSCULAR EVERY 6 HOURS
Refills: 0 | Status: DISCONTINUED | OUTPATIENT
Start: 2021-03-13 | End: 2021-03-19

## 2021-03-13 RX ADMIN — ARIPIPRAZOLE 10 MILLIGRAM(S): 15 TABLET ORAL at 11:29

## 2021-03-13 NOTE — ED BEHAVIORAL HEALTH NOTE - BEHAVIORAL HEALTH NOTE
COVID Exposure Screen- Patient     1.*Have you had a COVID-19 test in the last 90 days?  (  ) Yes   ( x ) No   (  ) Unknown- Reason: _____         IF YES PROCEED TO QUESTION #2. IF NO OR UNKNOWN, PLEASE SKIP TO QUESTION #3.     1.Date of test(s) and result(s): ________       2.*Have you tested positive for COVID-19 antibodies? (  ) Yes   (  ) No   (  ) Unknown- Reason: _____           IF YES PROCEED TO QUESTION #4. IF NO or UNKNOWN, PLEASE SKIP TO QUESTION #5.     1.Date of positive antibody test: ________       2.*Have you received 2 doses of the COVID-19 vaccine? (  ) Yes   (  ) No   (  ) Unknown- Reason: _____          IF YES PROCEED TO QUESTION #6. IF NO or UNKNOWN, PLEASE SKIP TO QUESTION #7.     1.Date of second dose: ________         1.*In the past 10 days, have you been around anyone with a positive COVID-19 test?* (  ) Yes   (x  ) No   (  ) Unknown- Reason: ____         IF YES PROCEED TO QUESTION #8. IF NO or UNKNOWN, PLEASE SKIP TO QUESTION #13.     1.Were you within 6 feet of them for at least 15 minutes? (  ) Yes   (  ) No   (  ) Unknown- Reason: _____       2.Have you provided care for them? (  ) Yes   (  ) No   (  ) Unknown- Reason: ______       3.Have you had direct physical contact with them (touched, hugged, or kissed them)? (  ) Yes   (  ) No    (  ) Unknown- Reason: _____         1.Have you shared eating or drinking utensils with them? (  ) Yes   (  ) No    (  ) Unknown- Reason: ____       2.Have they sneezed, coughed, or somehow gotten respiratory droplets on you? (  ) Yes   (  ) No    (  ) Unknown- Reason: ______       3.*Have you been out of [place] within the past 10 days?* (  ) Yes   ( x ) No   (  ) Unknown- Reason: _____         IF YES PLEASE ANSWER THE FOLLOWING QUESTIONS:     1.Which state/country have you been to? ______         1.Were you there over 24 hours? (  ) Yes   (  ) No    (  ) Unknown- Reason: ______       2.Date of return to [facility]: ______

## 2021-03-13 NOTE — ED ADULT NURSE REASSESSMENT NOTE - NS ED NURSE REASSESS COMMENT FT1
Pt remains calm and cooperative at this time, leg shackles intact, R arm handcuffed to stretcher with police presence at bedside. Pt provided with oral fluids which was well tolerated. Comfort measures provided.

## 2021-03-13 NOTE — ED ADULT NURSE REASSESSMENT NOTE - NS ED NURSE REASSESS COMMENT FT1
BREAK COVERAGE- Patient sleeping, respiration even and unlabored. Remains under arrest. [facility] officer at bedside.

## 2021-03-13 NOTE — ED ADULT NURSE REASSESSMENT NOTE - NS ED NURSE REASSESS COMMENT FT1
Pt received at 12am shift change. Pt under arrest with [employer] at bedside; pt sleeping; respirations even and non labored.

## 2021-03-13 NOTE — ED BEHAVIORAL HEALTH NOTE - BEHAVIORAL HEALTH NOTE
Writer outreached [facility] #[phone] speaking with Dr. Khan who reported that they have 1 female bed noting DOC patients take priority. All forms when available can be faxed to [facility] fax #[phone]. Writer met with HALEY Arreola at bedside. Writer informed HALEY Arreola of circumstances requesting outreach to supervisor to begin process for fingerprinting, movement slip, etc. PO accepting of information confirmed would outreach noting precinct to be short staffed at current. Writer awaiting form follow up. Writer outreached [facility] #[phone] speaking with Dr. Khan who reported that they have 1 female bed noting DOC patients take priority. All forms when available can be faxed to [facility] fax #[phone]. Writer met with HALEY Arreola at bedside. Writer informed HALEY Arreola of circumstances requesting outreach to supervisor to begin process for fingerprinting, movement slip, etc. PO accepting of information confirmed would outreach noting precinct to be short staffed at current. Writer awaiting form follow up.      16:30: Movement slip and Omniform was provided by PO at bedside. Writer faxed complete packet minus legals to [facility] fax #[phone]. Writer followed up with Dr. Khan who reported that the 1 open bed this AM has since been filled. Case to not be reviewed for pt to be placed on wait list. Writer outreached [facility] #[phone] speaking with Dr. Khan who reported that they have 1 female bed noting DOC patients take priority. All forms when available can be faxed to [facility] fax #[phone]. Writer met with HALEY Arreola at bedside. Writer informed HALEY Arreola of circumstances requesting outreach to supervisor to begin process for fingerprinting, movement slip, etc. PO accepting of information confirmed would outreach noting precinct to be short staffed at current. Writer awaiting form follow up.      16:30: Movement slip and Omniform was provided by PO at bedside. Writer faxed complete packet minus legals to [facility] fax #[phone]. Writer followed up with Dr. Khan who reported that the 1 open bed this AM has since been filled. Case to now be reviewed for pt to be placed on wait list.

## 2021-03-13 NOTE — ED ADULT NURSE REASSESSMENT NOTE - NS ED NURSE REASSESS COMMENT FT1
Received report from night RN, pt calm & cooperative denies si/hi/avh presently, [employer] at bedside safety & comfort measures maintained eval on going.

## 2021-03-13 NOTE — ED BEHAVIORAL HEALTH NOTE - BEHAVIORAL HEALTH NOTE
Patient reassessed this morning. Observed standing on side of stretcher staring at the wall. She continues to present psychotic with paranoid delusions. Patient slept well last night, ate breakfast this am and accepted Abilify 5 mg this am. She remains in police custody pending transfer to forensic unit.   ICU Vital Signs Last 24 Hrs  T(C): 36.7 (13 Mar 2021 06:34), Max: 36.7 (12 Mar 2021 15:39)  T(F): 98 (13 Mar 2021 06:34), Max: 98 (12 Mar 2021 15:39)  HR: 75 (13 Mar 2021 06:34) (55 - 79)  BP: 145/72 (13 Mar 2021 06:34) (106/55 - 145/72)  BP(mean): --  ABP: --  ABP(mean): --  RR: 18 (13 Mar 2021 06:34) (16 - 18)  SpO2: 100% (13 Mar 2021 06:34) (100% - 100%)

## 2021-03-14 NOTE — ED ADULT NURSE REASSESSMENT NOTE - NS ED NURSE REASSESS COMMENT FT1
Pt sleeping; respirations even and non labored; [employer] at bedside with patient; pt awaiting xfer to forensic unit at [facility] when bed is available.

## 2021-03-14 NOTE — ED BEHAVIORAL HEALTH NOTE - BEHAVIORAL HEALTH NOTE
Pt. was seen sleeping in her room, no overnight events, no prn medications were given. Pt. will be boarding since [facility] forensic unit has no beds available.    ICU Vital Signs Last 24 Hrs  T(C): 37 (14 Mar 2021 03:30), Max: 37 (14 Mar 2021 03:30)  T(F): 98.6 (14 Mar 2021 03:30), Max: 98.6 (14 Mar 2021 03:30)  HR: 55 (14 Mar 2021 03:30) (55 - 90)  BP: 109/50 (14 Mar 2021 03:30) (109/50 - 145/72)  BP(mean): --  ABP: --  ABP(mean): --  RR: 16 (14 Mar 2021 03:30) (16 - 18)  SpO2: 100% (14 Mar 2021 03:30) (100% - 100%)

## 2021-03-14 NOTE — BH CONSULTATION LIAISON PROGRESS NOTE - NSBHASSESSMENTFT_PSY_ALL_CORE
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 Upon arrival to ED patient was agitated and required Ativan 2 mg Haldol 5 mg Im. Patient slept for several hours after receiving medication. She was later interviewed in a private setting.     Patient presents with berna with psychotic symptoms. She is paranoid and her thought process is disorganized. Patient speech is loud, rapid and pressured. Collateral from family supports she has not been sleeping and acting bizarre. Patient Pt has poor insight into her symptoms and has been exercising poor judgement. She is not able to care for herself due to acute manic and psychotic sx and requires inpatient hospitalization for stabilization and safety. Current presentation does not appear to be caused by substance intoxication or withdrawal as utox resulted negative and collateral supports patient does not have any h/o substance abuse. Patient is currently under arrest and is pending forensic admission. She will require transfer to a forensic unit. Will start patient on Ability 5 mg to target psychosis and continue with Ativan 2 mg Haldol 5 mg im for agitation.

## 2021-03-14 NOTE — ED ADULT NURSE REASSESSMENT NOTE - NS ED NURSE REASSESS COMMENT FT1
Received report from night RN, pt calm & cooperative denies si/hi/avh presently, pt verbalizing he wants to go home emotional reassurance provided pt awaiting bed assignment eval on going.

## 2021-03-14 NOTE — ED BEHAVIORAL HEALTH NOTE - BEHAVIORAL HEALTH NOTE
Writer followed up with [facility] ER #[phone] for potential transfer. Writer spoke with Dr. Guzmán who reported no bed available today. MD explains the unit with 5 beds only 1 bed assigned specifically for [employer] cases there additional 4 DOC. No current [employer] cases on unit at current so pt could be accepted upon bed opening. MD confirmed receipt of pt's paperwork. MD recommends follow up tomorrow at 10am.

## 2021-03-14 NOTE — ED ADULT NURSE REASSESSMENT NOTE - NS ED NURSE REASSESS COMMENT FT1
Break RN: Pt remains in room, calm and cooperative, remains under custody of [employer] PO at bedside. Will continue to monitor
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
## 2021-03-15 DIAGNOSIS — F29 UNSPECIFIED PSYCHOSIS NOT DUE TO A SUBSTANCE OR KNOWN PHYSIOLOGICAL CONDITION: ICD-10-CM

## 2021-03-15 DIAGNOSIS — R51.9 HEADACHE, UNSPECIFIED: ICD-10-CM

## 2021-03-15 DIAGNOSIS — Z29.9 ENCOUNTER FOR PROPHYLACTIC MEASURES, UNSPECIFIED: ICD-10-CM

## 2021-03-15 DIAGNOSIS — F31.9 BIPOLAR DISORDER, UNSPECIFIED: ICD-10-CM

## 2021-03-15 LAB
ANION GAP SERPL CALC-SCNC: 9 MMOL/L — SIGNIFICANT CHANGE UP (ref 7–14)
BUN SERPL-MCNC: 12 MG/DL — SIGNIFICANT CHANGE UP (ref 7–23)
CALCIUM SERPL-MCNC: 9.8 MG/DL — SIGNIFICANT CHANGE UP (ref 8.4–10.5)
CHLORIDE SERPL-SCNC: 100 MMOL/L — SIGNIFICANT CHANGE UP (ref 98–107)
CO2 SERPL-SCNC: 26 MMOL/L — SIGNIFICANT CHANGE UP (ref 22–31)
CREAT SERPL-MCNC: 0.72 MG/DL — SIGNIFICANT CHANGE UP (ref 0.5–1.3)
GLUCOSE SERPL-MCNC: 108 MG/DL — HIGH (ref 70–99)
MAGNESIUM SERPL-MCNC: 1.9 MG/DL — SIGNIFICANT CHANGE UP (ref 1.6–2.6)
POTASSIUM SERPL-MCNC: 4.2 MMOL/L — SIGNIFICANT CHANGE UP (ref 3.5–5.3)
POTASSIUM SERPL-SCNC: 4.2 MMOL/L — SIGNIFICANT CHANGE UP (ref 3.5–5.3)
SODIUM SERPL-SCNC: 135 MMOL/L — SIGNIFICANT CHANGE UP (ref 135–145)

## 2021-03-15 PROCEDURE — 99233 SBSQ HOSP IP/OBS HIGH 50: CPT

## 2021-03-15 PROCEDURE — 99223 1ST HOSP IP/OBS HIGH 75: CPT

## 2021-03-15 RX ORDER — IBUPROFEN 200 MG
600 TABLET ORAL EVERY 6 HOURS
Refills: 0 | Status: DISCONTINUED | OUTPATIENT
Start: 2021-03-15 | End: 2021-03-19

## 2021-03-15 RX ORDER — ENOXAPARIN SODIUM 100 MG/ML
40 INJECTION SUBCUTANEOUS DAILY
Refills: 0 | Status: DISCONTINUED | OUTPATIENT
Start: 2021-03-15 | End: 2021-03-19

## 2021-03-15 RX ORDER — ACETAMINOPHEN 500 MG
650 TABLET ORAL ONCE
Refills: 0 | Status: COMPLETED | OUTPATIENT
Start: 2021-03-15 | End: 2021-03-15

## 2021-03-15 RX ADMIN — Medication 650 MILLIGRAM(S): at 14:02

## 2021-03-15 RX ADMIN — ARIPIPRAZOLE 10 MILLIGRAM(S): 15 TABLET ORAL at 14:02

## 2021-03-15 RX ADMIN — ENOXAPARIN SODIUM 40 MILLIGRAM(S): 100 INJECTION SUBCUTANEOUS at 22:27
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
## 2021-03-15 NOTE — BH CONSULTATION LIAISON PROGRESS NOTE - NSBHASSESSMENTFT_PSY_ALL_CORE
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 Upon arrival to ED patient was agitated and required Ativan 2 mg Haldol 5 mg Im. Patient slept for several hours after receiving medication. She was later interviewed in a private setting.     Patient presents with berna with psychotic symptoms. She is paranoid and her thought process is disorganized. Patient speech is loud, rapid and pressured. Collateral from family supports she has not been sleeping and acting bizarre. Patient Pt has poor insight into her symptoms and has been exercising poor judgement. She is not able to care for herself due to acute manic and psychotic sx and requires inpatient hospitalization for stabilization and safety. Current presentation does not appear to be caused by substance intoxication or withdrawal as utox resulted negative and collateral supports patient does not have any h/o substance abuse. Patient is currently under arrest and is pending forensic admission. She will require transfer to a forensic unit. Will start patient on Ability 5 mg to target psychosis and continue with Ativan 2 mg Haldol 5 mg im for agitation.    3/15--patient is A&Ox3, she is calm and cooperative.     RECOMMENDATIONS:  -Continue with Abilify 10mg PO   -For agitation give either Haldol 5mg PO q6h or Ativan 2mg PO q6h PRN   -Patient is currently arrested, has officer at bedside. If arraignment happens overnight and  leaves PATIENT SHOULD BE PUT UNDER 1:1 until further psychiatric followup  -PATIENT IS UNABLE TO LEAVE AGAINST MEDICAL ADVICE

## 2021-03-15 NOTE — ED ADULT NURSE REASSESSMENT NOTE - NS ED NURSE REASSESS COMMENT FT1
in bed A and OX 3  in NAD resting comfortably,  right arm cuffed to bed railing, [employer] officer at bedside, no SSx of injury noted on pt's skin, pt endorses no complaints, needs attended to, VS as noted.

## 2021-03-15 NOTE — H&P ADULT - NSHPPHYSICALEXAM_GEN_ALL_CORE
PHYSICAL EXAM:  GENERAL: obese, NAD   HEAD:  Atraumatic, Normocephalic  EYES: EOMI, PERRLA, conjunctiva and sclera clear  NECK: Supple, No JVD  CHEST/LUNG: Clear to auscultation bilaterally; No wheeze  HEART: Regular rate and rhythm; No murmurs, rubs, or gallops  ABDOMEN: Soft, Nontender, Nondistended; Bowel sounds present  EXTREMITIES:  2+ Peripheral Pulses, No clubbing, cyanosis, or edema  NEUROLOGY/PSYCHE: A&0x3, calm, cooperative, poor insight   SKIN: No rashes or lesions

## 2021-03-15 NOTE — BH CONSULTATION LIAISON PROGRESS NOTE - NSBHASSESSMENTFT_PSY_ALL_CORE
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 Upon arrival to ED patient was agitated and required Ativan 2 mg Haldol 5 mg Im. Patient slept for several hours after receiving medication. She was later interviewed in a private setting.     Patient presents with berna with psychotic symptoms. She is paranoid and her thought process is disorganized. Patient speech is loud, rapid and pressured, with labile mood. Collateral from family supports she has not been sleeping and acting bizarre. Patient Pt has poor insight into her symptoms and has been exercising poor judgement. She is not able to care for herself due to acute manic and psychotic sx and requires inpatient hospitalization for stabilization and safety. Current presentation does not appear to be caused by substance intoxication or withdrawal as utox resulted negative and collateral supports patient does not have any h/o substance abuse. Patient is currently under arrest and is pending forensic admission. She will require transfer to a forensic unit. Will start patient on Ability 5 mg to target psychosis and continue with Ativan 2 mg Haldol 5 mg im for agitation.    Patient requires inpatient hospitalization, but given she is under arrest, cannot be admitted to [facility]. There is no forensic women's unit available at this time, discussed with ER attending that patient will likely require admission to medicine.  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 Upon arrival to ED patient was agitated and required Ativan 2 mg Haldol 5 mg Im. Patient slept for several hours after receiving medication. She was later interviewed in a private setting.     Patient presents with berna with psychotic symptoms. Patient speech is loud, rapid and pressured, with labile mood. Collateral from family supports she has not been sleeping and acting bizarre. Patient Pt has poor insight into her symptoms and has been exercising poor judgement. Patient is labile, unpredictable, and she is not able to care for herself due to acute manic and psychotic sx and requires inpatient hospitalization for stabilization and safety. Current presentation does not appear to be caused by substance intoxication or withdrawal as utox resulted negative and collateral supports patient does not have any h/o substance abuse. Will continue patient on Ability 5 mg to target psychosis/berna and continue with Ativan 2 mg Haldol 5 mg im for agitation.    Patient requires inpatient hospitalization, but given she is under arrest, cannot be admitted to [facility]. There is no forensic women's unit available at this time, discussed with ER attending that patient will likely require admission to medicine.  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 , with pmhx:  PCOS,  hx bipolar disorder with psychosis, 1 prior hospitalizations ( [facility] 3/9-3/13/20) not in current treatment,  no known hx of si/sa, no known h/o drug abuse or complicated withdrawal, no known hx of violence,  Pt BIB by [employer] under arrest for trespassing. Upon arrival to ED patient was agitated and required Ativan 2 mg Haldol 5 mg Im. Patient slept for several hours after receiving medication. She was later interviewed in a private setting.     As per this morning assessment patient continues to demonstrate sx of  berna with  some disorganization. Patient speech is loud, rapid and pressured, with labile mood. Collateral from family supports she has not been sleeping and acting bizarre.  Pt has poor insight into her symptoms and has been exercising poor judgement. Patient is labile, unpredictable, and she is not able to care for herself due to acute manic and psychotic sx and requires inpatient hospitalization for stabilization and safety. Current presentation does not appear to be caused by substance intoxication or withdrawal as utox resulted negative and collateral supports patient does not have any h/o substance abuse. Will continue patient on Ability 10 mg to target psychosis/berna and continue with Ativan 2 mg Haldol 5 mg im for agitation.    Patient requires inpatient hospitalization, but given she is under arrest, cannot be admitted to [facility]. There is no forensic women's unit available at this time, discussed with ER attending that patient will likely require admission to medicine.

## 2021-03-15 NOTE — ED ADULT NURSE REASSESSMENT NOTE - NS ED NURSE REASSESS COMMENT FT1
Pt sleeping; respirations even and non labored. Pt awaiting xfer to forensic unit at [facility] when bed becomes available.

## 2021-03-15 NOTE — H&P ADULT - NSHPREVIEWOFSYSTEMS_GEN_ALL_CORE
CONSTITUTIONAL: denies fever, chills or night sweats.   EYES: denies eye pain or blurry vision.  ENT: denies ear pain, nose bleed or sore throat.  NECK: +neck pain radiating down to her feet   RESPIRATORY: denies SOB, cough or wheezing.   CV: denies chest pain or palpitation.  GI: denies abdominal pain, N/V, diarrhea, constipation, melena or hematochezia.  : denies dysuria, hematuria or frequency.   NEUROLOGICAL: +migraine/headache   SKIN: denies itching, burning or rashes.   MUSCULOSKELETAL: leg pain   HEME: denies easy bruising, or bleeding gums  Psyche: denies SI/HI, denies auditory/visual hallucination
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
## 2021-03-15 NOTE — H&P ADULT - NSHPSOCIALHISTORY_GEN_ALL_CORE
ETOH - social drinker per sister Lakshmi  Smoke - patient denies  Drugs - patient denies illegal drug use       Father - family has no contact with biological father   Mother - ESRD on HD

## 2021-03-15 NOTE — ED BEHAVIORAL HEALTH NOTE - BEHAVIORAL HEALTH NOTE
Writer called [facility] spoke to Vandana [phone] who states no beds available today.

## 2021-03-15 NOTE — ED ADULT NURSE REASSESSMENT NOTE - NS ED NURSE REASSESS COMMENT FT1
Received pt from previous shift. Pt calm at this time. Awake, ambulatory and alert. Under arrest, handcuffed, with nypd 1:1. Not in any physical distress. Even and unlabored breathing. Will continue monitoring and follow up. pending transfer to [facility].

## 2021-03-15 NOTE — H&P ADULT - PROBLEM SELECTOR PLAN 1
- Patient found in a stranger's house. It was determined that patient requires inpatient hospitalization, but given she is under arrest for trespassing, she cannot be admitted to [facility]. Awaiting for bed availability at [facility].   - she was evaluated by  in ED,  psychiatry to formally evaluate patient   - currently calm, cooperative but with poor insight   - continue abilify 10 mg daily although patient refused her dose yesterday and stated that she would not take it today either  - Ativan 2 mg q6 and Haldol 5 mg q6 IM for agitation

## 2021-03-15 NOTE — ED ADULT NURSE REASSESSMENT NOTE - NS ED NURSE REASSESS COMMENT FT1
pt transferred  upstairs to [address] [place] [address] room 549a. went with pes and nypd handcuffed to wheelchair. no issues noted. reported to rn there.

## 2021-03-15 NOTE — H&P ADULT - ASSESSMENT
35 year old female with history of PCOS, bipolar disorder was BIBA for psychiatric evaluation. Patient was found in a stranger's house. It was determined that patient requires inpatient hospitalization, but given she is under arrest, she cannot be admitted to [facility]. Awaiting for bed availability at [facility].

## 2021-03-16 LAB
ANION GAP SERPL CALC-SCNC: 12 MMOL/L — SIGNIFICANT CHANGE UP (ref 7–14)
BUN SERPL-MCNC: 11 MG/DL — SIGNIFICANT CHANGE UP (ref 7–23)
CALCIUM SERPL-MCNC: 9.5 MG/DL — SIGNIFICANT CHANGE UP (ref 8.4–10.5)
CHLORIDE SERPL-SCNC: 101 MMOL/L — SIGNIFICANT CHANGE UP (ref 98–107)
CO2 SERPL-SCNC: 22 MMOL/L — SIGNIFICANT CHANGE UP (ref 22–31)
CREAT SERPL-MCNC: 0.7 MG/DL — SIGNIFICANT CHANGE UP (ref 0.5–1.3)
FERRITIN SERPL-MCNC: 38 NG/ML — SIGNIFICANT CHANGE UP (ref 15–150)
GLUCOSE SERPL-MCNC: 98 MG/DL — SIGNIFICANT CHANGE UP (ref 70–99)
IRON SATN MFR SERPL: 13 % — LOW (ref 14–50)
IRON SATN MFR SERPL: 36 UG/DL — SIGNIFICANT CHANGE UP (ref 30–160)
MAGNESIUM SERPL-MCNC: 2 MG/DL — SIGNIFICANT CHANGE UP (ref 1.6–2.6)
PHOSPHATE SERPL-MCNC: 3.6 MG/DL — SIGNIFICANT CHANGE UP (ref 2.5–4.5)
POTASSIUM SERPL-MCNC: 4.4 MMOL/L — SIGNIFICANT CHANGE UP (ref 3.5–5.3)
POTASSIUM SERPL-SCNC: 4.4 MMOL/L — SIGNIFICANT CHANGE UP (ref 3.5–5.3)
SARS-COV-2 IGG SERPL QL IA: POSITIVE
SARS-COV-2 IGM SERPL IA-ACNC: 242 INDEX — HIGH
SODIUM SERPL-SCNC: 135 MMOL/L — SIGNIFICANT CHANGE UP (ref 135–145)
TIBC SERPL-MCNC: 282 UG/DL — SIGNIFICANT CHANGE UP (ref 220–430)
UIBC SERPL-MCNC: 246 UG/DL — SIGNIFICANT CHANGE UP (ref 110–370)

## 2021-03-16 PROCEDURE — 99232 SBSQ HOSP IP/OBS MODERATE 35: CPT

## 2021-03-16 PROCEDURE — 99233 SBSQ HOSP IP/OBS HIGH 50: CPT

## 2021-03-16 RX ADMIN — Medication 600 MILLIGRAM(S): at 12:18

## 2021-03-16 RX ADMIN — ENOXAPARIN SODIUM 40 MILLIGRAM(S): 100 INJECTION SUBCUTANEOUS at 12:17

## 2021-03-16 RX ADMIN — Medication 600 MILLIGRAM(S): at 13:18

## 2021-03-16 RX ADMIN — ARIPIPRAZOLE 10 MILLIGRAM(S): 15 TABLET ORAL at 12:17

## 2021-03-16 NOTE — BH CONSULTATION LIAISON PROGRESS NOTE - NSBHASSESSMENTFT_PSY_ALL_CORE
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 Upon arrival to ED patient was agitated and required Ativan 2 mg Haldol 5 mg Im. Patient slept for several hours after receiving medication. She was later interviewed in a private setting.     Patient presents with berna with psychotic symptoms. She is paranoid and her thought process is disorganized. Patient speech is loud, rapid and pressured. Collateral from family supports she has not been sleeping and acting bizarre. Patient Pt has poor insight into her symptoms and has been exercising poor judgement. She is not able to care for herself due to acute manic and psychotic sx and requires inpatient hospitalization for stabilization and safety. Current presentation does not appear to be caused by substance intoxication or withdrawal as utox resulted negative and collateral supports patient does not have any h/o substance abuse. Patient is currently under arrest and is pending forensic admission. She will require transfer to a forensic unit. Will start patient on Ability 5 mg to target psychosis and continue with Ativan 2 mg Haldol 5 mg im for agitation.    3/15--patient is A&Ox3, she is calm and cooperative.   3/16- can be easily irritable, no aggression though.    RECOMMENDATIONS:  - Patient is currently arrested, has officer at bedside. If arraignment happens overnight and  leaves PATIENT SHOULD BE PUT UNDER 1:1 until further psychiatric followup. CANNOT LEAVE AMA.  -Continue with Abilify 10mg PO   -For AGGRESSION Haldol 5mg q 6hrs prn IM/IV/PO

## 2021-03-17 PROCEDURE — 99232 SBSQ HOSP IP/OBS MODERATE 35: CPT

## 2021-03-17 PROCEDURE — 99233 SBSQ HOSP IP/OBS HIGH 50: CPT

## 2021-03-17 NOTE — BH CONSULTATION LIAISON PROGRESS NOTE - NSBHASSESSMENTFT_PSY_ALL_CORE
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 Upon arrival to ED patient was agitated and required Ativan 2 mg Haldol 5 mg Im. Patient slept for several hours after receiving medication. She was later interviewed in a private setting.     Patient presents with berna with psychotic symptoms. She is paranoid and her thought process is disorganized. Patient speech is loud, rapid and pressured. Collateral from family supports she has not been sleeping and acting bizarre. Patient Pt has poor insight into her symptoms and has been exercising poor judgement. She is not able to care for herself due to acute manic and psychotic sx and requires inpatient hospitalization for stabilization and safety. Current presentation does not appear to be caused by substance intoxication or withdrawal as utox resulted negative and collateral supports patient does not have any h/o substance abuse. Patient is currently under arrest and is pending forensic admission. She will require transfer to a forensic unit. Will start patient on Ability 5 mg to target psychosis and continue with Ativan 2 mg Haldol 5 mg im for agitation.    3/15--patient is A&Ox3, she is calm and cooperative.   3/16- can be easily irritable, no aggression though.    3/17- irritable, minimizes symptoms and events prior to admission. Family concerned about frequent similar behaviors which have led to inpatient psychiatric admissions, and now legal issues.    RECOMMENDATIONS:  - Patient is currently arrested, has officer at bedside. If arraignment happens overnight and  leaves PATIENT SHOULD BE PUT UNDER 1:1 until further psychiatric followup. CANNOT LEAVE AMA.  -Continue with Abilify 10mg PO.   -For AGGRESSION Haldol 5mg q 6hrs prn IM/IV/PO

## 2021-03-17 NOTE — PROVIDER CONTACT NOTE (OTHER) - ASSESSMENT
No IV access
Pt alert and oriented x 4 free from respiratory distress, Calm at this time but refusing to take medication and refusing for IV to be put in. Pt in custody, police at the bedside, Pt hancuffed B/L legs and L arm

## 2021-03-18 PROBLEM — F31.9 BIPOLAR DISORDER, UNSPECIFIED: Chronic | Status: ACTIVE | Noted: 2021-03-12

## 2021-03-18 PROBLEM — E28.2 POLYCYSTIC OVARIAN SYNDROME: Chronic | Status: ACTIVE | Noted: 2021-03-15

## 2021-03-18 LAB
ANION GAP SERPL CALC-SCNC: 15 MMOL/L — HIGH (ref 7–14)
BUN SERPL-MCNC: 12 MG/DL — SIGNIFICANT CHANGE UP (ref 7–23)
CALCIUM SERPL-MCNC: 9.5 MG/DL — SIGNIFICANT CHANGE UP (ref 8.4–10.5)
CHLORIDE SERPL-SCNC: 100 MMOL/L — SIGNIFICANT CHANGE UP (ref 98–107)
CO2 SERPL-SCNC: 22 MMOL/L — SIGNIFICANT CHANGE UP (ref 22–31)
CREAT SERPL-MCNC: 0.75 MG/DL — SIGNIFICANT CHANGE UP (ref 0.5–1.3)
GLUCOSE SERPL-MCNC: 102 MG/DL — HIGH (ref 70–99)
HCT VFR BLD CALC: 37.3 % — SIGNIFICANT CHANGE UP (ref 34.5–45)
HGB BLD-MCNC: 12.1 G/DL — SIGNIFICANT CHANGE UP (ref 11.5–15.5)
MAGNESIUM SERPL-MCNC: 2.1 MG/DL — SIGNIFICANT CHANGE UP (ref 1.6–2.6)
MCHC RBC-ENTMCNC: 25.5 PG — LOW (ref 27–34)
MCHC RBC-ENTMCNC: 32.4 GM/DL — SIGNIFICANT CHANGE UP (ref 32–36)
MCV RBC AUTO: 78.7 FL — LOW (ref 80–100)
NRBC # BLD: 0 /100 WBCS — SIGNIFICANT CHANGE UP
NRBC # FLD: 0 K/UL — SIGNIFICANT CHANGE UP
PHOSPHATE SERPL-MCNC: 4 MG/DL — SIGNIFICANT CHANGE UP (ref 2.5–4.5)
PLATELET # BLD AUTO: 441 K/UL — HIGH (ref 150–400)
POTASSIUM SERPL-MCNC: 4.5 MMOL/L — SIGNIFICANT CHANGE UP (ref 3.5–5.3)
POTASSIUM SERPL-SCNC: 4.5 MMOL/L — SIGNIFICANT CHANGE UP (ref 3.5–5.3)
RBC # BLD: 4.74 M/UL — SIGNIFICANT CHANGE UP (ref 3.8–5.2)
RBC # FLD: 16.7 % — HIGH (ref 10.3–14.5)
SARS-COV-2 RNA SPEC QL NAA+PROBE: SIGNIFICANT CHANGE UP
SODIUM SERPL-SCNC: 137 MMOL/L — SIGNIFICANT CHANGE UP (ref 135–145)
WBC # BLD: 10.92 K/UL — HIGH (ref 3.8–10.5)
WBC # FLD AUTO: 10.92 K/UL — HIGH (ref 3.8–10.5)

## 2021-03-18 PROCEDURE — 99232 SBSQ HOSP IP/OBS MODERATE 35: CPT

## 2021-03-18 PROCEDURE — 99233 SBSQ HOSP IP/OBS HIGH 50: CPT

## 2021-03-18 NOTE — PROVIDER CONTACT NOTE (OTHER) - ACTION/TREATMENT ORDERED:
ANDon duty and ABNER mccallum notified  Emotional support given and brought the pt back  to the floor . pt is calm now..
Pt refused IV. RN explained the benefits of having the IV to pt. Pt states she would rather do it when it is needed.
Will continue to ask and educate the patient about importance of Medication administration and of putting in IV

## 2021-03-18 NOTE — PROVIDER CONTACT NOTE (OTHER) - REASON
pt was trying to elope code flight called and PCA and RNS  followed the pt. about 10 security guards were present
Pt is without IV access
Pt refusing IV to be in placed and refusing to take medication Ariprazole and lovonox

## 2021-03-18 NOTE — CHART NOTE - NSCHARTNOTEFT_GEN_A_CORE
Code gray called by RN for patient elopement, brought back in room by security.  Provider spoke with patient at bedside, patient states she wants to see Psych to discuss further discharge plans. As per Psych patient has not capacity to leave  ama.     Patient on 1:1 CO for safety/elopement. Spoke with Psych NP ([id number])- who is unable to come see patient, patient informed that Dr. Paige will see her tomorrow.   Discussed case with ABNER Maddox and Director Cady at bedside, handoff to night provider for further monitoring

## 2021-03-18 NOTE — BH CONSULTATION LIAISON PROGRESS NOTE - NSBHASSESSMENTFT_PSY_ALL_CORE
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 Upon arrival to ED patient was agitated and required Ativan 2 mg Haldol 5 mg Im. Patient slept for several hours after receiving medication. She was later interviewed in a private setting.     Patient presents with berna with psychotic symptoms. She is paranoid and her thought process is disorganized. Patient speech is loud, rapid and pressured. Collateral from family supports she has not been sleeping and acting bizarre. Patient Pt has poor insight into her symptoms and has been exercising poor judgement. She is not able to care for herself due to acute manic and psychotic sx and requires inpatient hospitalization for stabilization and safety. Current presentation does not appear to be caused by substance intoxication or withdrawal as utox resulted negative and collateral supports patient does not have any h/o substance abuse. Patient is currently under arrest and is pending forensic admission. She will require transfer to a forensic unit. Will start patient on Ability 5 mg to target psychosis and continue with Ativan 2 mg Haldol 5 mg im for agitation.    3/15--patient is A&Ox3, she is calm and cooperative.   3/16- can be easily irritable, no aggression though.    3/17- irritable, minimizes symptoms and events prior to admission. Family concerned about frequent similar behaviors which have led to inpatient psychiatric admissions, and now legal issues.    RECOMMENDATIONS:  - PATIENT SHOULD BE PUT UNDER 1:1 until further psychiatric followup. CANNOT LEAVE AMA.  -Continue with Abilify 10mg PO.   -For AGGRESSION Haldol 5mg q 6hrs prn IM/IV/PO

## 2021-03-18 NOTE — BH CONSULTATION LIAISON PROGRESS NOTE - NSBHMSESPEECH_PSY_A_CORE
Normal volume, rate, productivity, spontaneity and articulation

## 2021-03-18 NOTE — PROVIDER CONTACT NOTE (OTHER) - SITUATION
Pt refusing IV to be in placed and refusing to take medication Ariprazole and lovonox
pt stated I don't have any psych issue so that I am leaving the floor .  code flight called.     pt keep saying psych  cleared her to go home since after noon. emotional support given to the pt .
Pt is without IV access

## 2021-03-19 ENCOUNTER — INPATIENT (INPATIENT)
Facility: HOSPITAL | Age: 36
LOS: 6 days | Discharge: HOME | End: 2021-03-26
Attending: PSYCHIATRY & NEUROLOGY | Admitting: PSYCHIATRY & NEUROLOGY
Payer: MEDICAID

## 2021-03-19 ENCOUNTER — TRANSCRIPTION ENCOUNTER (OUTPATIENT)
Age: 36
End: 2021-03-19

## 2021-03-19 VITALS
OXYGEN SATURATION: 100 % | SYSTOLIC BLOOD PRESSURE: 140 MMHG | RESPIRATION RATE: 18 BRPM | DIASTOLIC BLOOD PRESSURE: 66 MMHG | HEART RATE: 96 BPM | TEMPERATURE: 98 F

## 2021-03-19 VITALS — WEIGHT: 242.07 LBS | TEMPERATURE: 99 F | RESPIRATION RATE: 18 BRPM | HEIGHT: 62 IN

## 2021-03-19 DIAGNOSIS — F31.2 BIPOLAR DISORDER, CURRENT EPISODE MANIC SEVERE WITH PSYCHOTIC FEATURES: ICD-10-CM

## 2021-03-19 PROCEDURE — 99222 1ST HOSP IP/OBS MODERATE 55: CPT

## 2021-03-19 PROCEDURE — 99238 HOSP IP/OBS DSCHRG MGMT 30/<: CPT

## 2021-03-19 PROCEDURE — 99233 SBSQ HOSP IP/OBS HIGH 50: CPT

## 2021-03-19 RX ORDER — ARIPIPRAZOLE 15 MG/1
1 TABLET ORAL
Qty: 0 | Refills: 0 | DISCHARGE
Start: 2021-03-19

## 2021-03-19 RX ORDER — HALOPERIDOL DECANOATE 100 MG/ML
5 INJECTION INTRAMUSCULAR EVERY 6 HOURS
Refills: 0 | Status: DISCONTINUED | OUTPATIENT
Start: 2021-03-19 | End: 2021-03-26

## 2021-03-19 RX ORDER — DIPHENHYDRAMINE HCL 50 MG
50 CAPSULE ORAL ONCE
Refills: 0 | Status: DISCONTINUED | OUTPATIENT
Start: 2021-03-19 | End: 2021-03-26

## 2021-03-19 RX ORDER — ACETAMINOPHEN 500 MG
650 TABLET ORAL EVERY 6 HOURS
Refills: 0 | Status: DISCONTINUED | OUTPATIENT
Start: 2021-03-19 | End: 2021-03-26

## 2021-03-19 RX ORDER — IBUPROFEN 200 MG
1 TABLET ORAL
Qty: 0 | Refills: 0 | DISCHARGE
Start: 2021-03-19

## 2021-03-19 RX ORDER — HALOPERIDOL DECANOATE 100 MG/ML
5 INJECTION INTRAMUSCULAR ONCE
Refills: 0 | Status: DISCONTINUED | OUTPATIENT
Start: 2021-03-19 | End: 2021-03-26

## 2021-03-19 RX ORDER — ARIPIPRAZOLE 15 MG/1
10 TABLET ORAL DAILY
Refills: 0 | Status: DISCONTINUED | OUTPATIENT
Start: 2021-03-20 | End: 2021-03-26

## 2021-03-19 RX ADMIN — ARIPIPRAZOLE 10 MILLIGRAM(S): 15 TABLET ORAL at 11:53

## 2021-03-19 NOTE — PROGRESS NOTE ADULT - ASSESSMENT
35 year old female with history of PCOS, bipolar disorder was BIBA for psychiatric evaluation. Patient was found in a stranger's house awaiting transfer to IP Psych unit.   
35 year old female with history of PCOS, bipolar disorder was BIBA for psychiatric evaluation. Patient was found in a stranger's house. It was determined that patient requires inpatient hospitalization, but given she is under arrest, she cannot be admitted to [facility]. Awaiting for bed availability at [facility].   
35 year old female with history of PCOS, bipolar disorder was BIBA for psychiatric evaluation. Patient was found in a stranger's house. It was determined that patient requires inpatient hospitalization, but given she is under arrest, she cannot be admitted to [facility]. Awaiting for bed availability at [facility].   
35 year old female with history of PCOS, bipolar disorder was BIBA for psychiatric evaluation. Patient was found in a stranger's house. It was determined that patient requires inpatient hospitalization, but given she is under arrest, she cannot be admitted to [facility]. Awaiting for bed availability at [facility].

## 2021-03-19 NOTE — BH INPATIENT PSYCHIATRY ASSESSMENT NOTE - CASE SUMMARY
Semmentha Maisonneuve is a 34 y/o female, single, domiciled with mother and brother, graduated from [employer], PPHx of Bipolar Disorder I with last manic episode, 2 prior psych hospitalizations ([place] 10/2020 and [facility] 3/9-3/13/20), not in current treatment, no h/o SA/NSSIB/SI, no h/o substance use, no known hx of violence, PMHx PCOS, brought in to [facility] ED by [employer] under arrest for trespassing (arraigned), followed by [facility] CL for berna with psychosis, code grey and code flight on 3/18, transferred to [facility] for further evaluation of berna and psychosis.    Pt presents as pleasant, cooperative, and persuasive towards team. On interview, she is denying affective, manic, and psychotic Sx. Pt tends to withhold information about past psychiatric history until confronted or if inconsistencies in story are pointed out. She is hyperverbal although not pressured, overinclusive, tangential. Pt is illogical and overly legalistic but not combative or aggressive. Denies bettye delusions however per records Pt does not have a living aunt who she claimed to visit. Denies SI/HI. Pt has limited insight and judgement at this time. Pt willing to approach staff if Pt feels overwhelmed or unsafe. Agrees to have team call Pt's mother and best friend for collateral information and clarify h/o Bipolar 1 and Sx prior to admission -- working diagnosis is Bipolar 1 with current berna episode in setting of medication nonadherence. Less likely psychosis due to medical condition or substance use given mostly unremarkable labs and no supporting hx. Continued inpatient admission required for assessment, safety and stabilization.

## 2021-03-19 NOTE — BH CONSULTATION LIAISON PROGRESS NOTE - NSBHPTASSESSDT_PSY_A_CORE
18-Mar-2021 16:35
15-Mar-2021 07:32
14-Mar-2021 11:45
17-Mar-2021 12:28
15-Mar-2021 13:50
16-Mar-2021 15:21
19-Mar-2021 14:44

## 2021-03-19 NOTE — DISCHARGE NOTE PROVIDER - NSDCMRMEDTOKEN_GEN_ALL_CORE_FT
ARIPiprazole 10 mg oral tablet: 1 tab(s) orally once a day  ibuprofen 600 mg oral tablet: 1 tab(s) orally every 6 hours, for Pain   LORazepam 2 mg oral tablet: 1 tab(s) orally every 6 hours, As needed, Anxiety

## 2021-03-19 NOTE — PROGRESS NOTE ADULT - PROBLEM SELECTOR PLAN 1
- Patient found in a stranger's house and arrested, arraigned today. Awaiting transfer to IP Psych unit  - [facility]   - Continue with Abilify 10mg PO   - For agitation give either Haldol 5mg PO q6h or Ativan 2mg PO q6h PRN  -  CANNOT LEAVE AMA  - Cont 1: 1  - currently calm, cooperative but with poor insight
- Patient found in a stranger's house and arrested, arraigned today. Awaiting transfer to IP Psych unit  [ ]  for IP beds  - Continue with Abilify 10mg PO   - For agitation give either Haldol 5mg PO q6h or Ativan 2mg PO q6h PRN  -  CANNOT LEAVE AMA  - Cont 1: 1  - currently calm, cooperative but with poor insight
- Patient found in a stranger's house. It was determined that patient requires inpatient hospitalization, but given she is under arrest for trespassing, she cannot be admitted to [facility]. Awaiting for bed availability at [facility]  per psych: Continue with Abilify 10mg PO, For agitation give either Haldol 5mg PO q6h or Ativan 2mg PO q6h PRN, CANNOT LEAVE AMA, will need 1:1 if arraigned at bedside  - currently calm, cooperative but with poor insight
- Patient found in a stranger's house. It was determined that patient requires inpatient hospitalization, but given she is under arrest for trespassing, she cannot be admitted to [facility]. Awaiting for bed availability at [facility]  - Continue with Abilify 10mg PO   - For agitation give either Haldol 5mg PO q6h or Ativan 2mg PO q6h PRN  -  CANNOT LEAVE AMA, will need 1:1 if arraigned at bedside  - currently calm, cooperative but with poor insight

## 2021-03-19 NOTE — BH CONSULTATION LIAISON PROGRESS NOTE - CURRENT MEDICATION
MEDICATIONS  (STANDING):  ARIPiprazole 10 milliGRAM(s) Oral daily  enoxaparin Injectable 40 milliGRAM(s) SubCutaneous daily    MEDICATIONS  (PRN):  haloperidol     Tablet 5 milliGRAM(s) Oral every 6 hours PRN moderate agitation  haloperidol    Injectable 5 milliGRAM(s) IntraMuscular every 6 hours PRN severe agitation  ibuprofen  Tablet. 600 milliGRAM(s) Oral every 6 hours PRN Mild Pain (1 - 3), Moderate Pain (4 - 6)  LORazepam     Tablet 2 milliGRAM(s) Oral every 6 hours PRN Anxiety  LORazepam   Injectable 2 milliGRAM(s) IntraMuscular every 6 hours PRN severe anxiety/restlessness  
MEDICATIONS  (STANDING):  acetaminophen   Tablet .. 650 milliGRAM(s) Oral once  ARIPiprazole 10 milliGRAM(s) Oral daily  enoxaparin Injectable 40 milliGRAM(s) SubCutaneous daily    MEDICATIONS  (PRN):  haloperidol     Tablet 5 milliGRAM(s) Oral every 6 hours PRN moderate agitation  haloperidol    Injectable 5 milliGRAM(s) IntraMuscular every 6 hours PRN severe agitation  ibuprofen  Tablet. 600 milliGRAM(s) Oral every 6 hours PRN Mild Pain (1 - 3), Moderate Pain (4 - 6)  LORazepam     Tablet 2 milliGRAM(s) Oral every 6 hours PRN Anxiety  LORazepam   Injectable 2 milliGRAM(s) IntraMuscular every 6 hours PRN severe anxiety/restlessness  
MEDICATIONS  (STANDING):  ARIPiprazole 10 milliGRAM(s) Oral daily    MEDICATIONS  (PRN):  haloperidol     Tablet 5 milliGRAM(s) Oral every 6 hours PRN moderate agitation  haloperidol    Injectable 5 milliGRAM(s) IntraMuscular every 6 hours PRN severe agitation  LORazepam     Tablet 2 milliGRAM(s) Oral every 6 hours PRN Anxiety  LORazepam   Injectable 2 milliGRAM(s) IntraMuscular every 6 hours PRN severe anxiety/restlessness  
MEDICATIONS  (STANDING):  ARIPiprazole 10 milliGRAM(s) Oral daily  enoxaparin Injectable 40 milliGRAM(s) SubCutaneous daily    MEDICATIONS  (PRN):  haloperidol     Tablet 5 milliGRAM(s) Oral every 6 hours PRN moderate agitation  haloperidol    Injectable 5 milliGRAM(s) IntraMuscular every 6 hours PRN severe agitation  ibuprofen  Tablet. 600 milliGRAM(s) Oral every 6 hours PRN Mild Pain (1 - 3), Moderate Pain (4 - 6)  LORazepam     Tablet 2 milliGRAM(s) Oral every 6 hours PRN Anxiety  LORazepam   Injectable 2 milliGRAM(s) IntraMuscular every 6 hours PRN severe anxiety/restlessness  
MEDICATIONS  (STANDING):  ARIPiprazole 10 milliGRAM(s) Oral daily    MEDICATIONS  (PRN):  haloperidol     Tablet 5 milliGRAM(s) Oral every 6 hours PRN moderate agitation  haloperidol    Injectable 5 milliGRAM(s) IntraMuscular every 6 hours PRN severe agitation  LORazepam     Tablet 2 milliGRAM(s) Oral every 6 hours PRN Anxiety  LORazepam   Injectable 2 milliGRAM(s) IntraMuscular every 6 hours PRN severe anxiety/restlessness

## 2021-03-19 NOTE — DISCHARGE NOTE PROVIDER - NSDCCPCAREPLAN_GEN_ALL_CORE_FT
PRINCIPAL DISCHARGE DIAGNOSIS  Diagnosis: Bipolar mood disorder  Assessment and Plan of Treatment: Continue  Abilify 10 mg as ordered.

## 2021-03-19 NOTE — BH CONSULTATION LIAISON PROGRESS NOTE - NSICDXBHPRIMARYDX_PSY_ALL_CORE
Psychosis   F29  

## 2021-03-19 NOTE — BH PATIENT PROFILE - STATED REASON FOR ADMISSION
states its a whole misunderstanding and that she never trespassed, the home health aid called [phone] on her out of anger.

## 2021-03-19 NOTE — BH CONSULTATION LIAISON PROGRESS NOTE - NSBHADMITIPOBS_PSY_A_CORE
Routine observation
Routine observation
Constant observation
Routine observation
Constant observation
Routine observation
Routine observation

## 2021-03-19 NOTE — BH INPATIENT PSYCHIATRY ASSESSMENT NOTE - NSBHCHARTREVIEWVS_PSY_A_CORE FT
Vital Signs Last 24 Hrs  T(C): 37.1 (19 Mar 2021 18:38), Max: 37.1 (19 Mar 2021 18:38)  T(F): 98.7 (19 Mar 2021 18:38), Max: 98.7 (19 Mar 2021 18:38)  HR: 96 (19 Mar 2021 11:30) (81 - 99)  BP: 140/66 (19 Mar 2021 11:30) (115/70 - 140/66)  BP(mean): --  RR: 18 (19 Mar 2021 18:38) (18 - 18)  SpO2: 100% (19 Mar 2021 11:30) (95% - 100%)

## 2021-03-19 NOTE — BH CONSULTATION LIAISON PROGRESS NOTE - NSBHMSETHTPROC_PSY_A_CORE
Overinclusive
Disorganized
Perseverative
Tangential/Perseverative/Illogical
Linear/Impaired reasoning
Linear
Linear/Impaired reasoning

## 2021-03-19 NOTE — BH INPATIENT PSYCHIATRY ASSESSMENT NOTE - CURRENT MEDICATION
MEDICATIONS  (STANDING):    MEDICATIONS  (PRN):  haloperidol     Tablet 5 milliGRAM(s) Oral every 6 hours PRN agitation  haloperidol    Injectable 5 milliGRAM(s) IntraMuscular once PRN agitation  LORazepam     Tablet 2 milliGRAM(s) Oral every 6 hours PRN agitation  LORazepam   Injectable 2 milliGRAM(s) IntraMuscular once PRN agitation
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
## 2021-03-19 NOTE — PROGRESS NOTE ADULT - PROBLEM SELECTOR PLAN 2
- currently calm, cooperative but with poor insight   - continue abilify  - haldol and ativan PRN
- currently calm, cooperative but with poor insight   - continue abilify  - haldol and ativan PRN
- currently calm, cooperative but with poor insight   - continue abilify, encourage compliance   - haldol and ativan PRN
- currently calm, cooperative but with poor insight   - continue abilify, encourage compliance   - haldol and ativan PRN

## 2021-03-19 NOTE — BH CONSULTATION LIAISON PROGRESS NOTE - NSBHCONSULTPRIMARYDISCUSSYES_PSY_A_CORE FT
Dr Hickey
Discussed with Dr. Enamorado- Patient requires inpatient hospitalization, but given she is under arrest, cannot be admitted to [facility]. There is no forensic women's unit available at this time, discussed with ER attending that patient will likely require admission to medicine. 
Jolanta YOO
Discussed with Dr. Burnette

## 2021-03-19 NOTE — BH PATIENT PROFILE - HOME MEDICATIONS
LORazepam 2 mg oral tablet , 1 tab(s) orally every 6 hours, As needed, Anxiety  ARIPiprazole 10 mg oral tablet , 1 tab(s) orally once a day  ibuprofen 600 mg oral tablet , 1 tab(s) orally every 6 hours, for Pain

## 2021-03-19 NOTE — BH CONSULTATION LIAISON PROGRESS NOTE - NSBHASSESSMENTFT_PSY_ALL_CORE
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 Upon arrival to ED patient was agitated and required Ativan 2 mg Haldol 5 mg Im. Patient slept for several hours after receiving medication. She was later interviewed in a private setting.     Patient presents with berna with psychotic symptoms. She is paranoid and her thought process is disorganized. Patient speech is loud, rapid and pressured. Collateral from family supports she has not been sleeping and acting bizarre. Patient Pt has poor insight into her symptoms and has been exercising poor judgement. She is not able to care for herself due to acute manic and psychotic sx and requires inpatient hospitalization for stabilization and safety. Current presentation does not appear to be caused by substance intoxication or withdrawal as utox resulted negative and collateral supports patient does not have any h/o substance abuse. Patient is currently under arrest and is pending forensic admission. She will require transfer to a forensic unit. Will start patient on Ability 5 mg to target psychosis and continue with Ativan 2 mg Haldol 5 mg im for agitation.    3/15--patient is A&Ox3, she is calm and cooperative.   3/16- can be easily irritable, no aggression though.    3/17- irritable, minimizes symptoms and events prior to admission. Family concerned about frequent similar behaviors which have led to inpatient psychiatric admissions, and now legal issues.    3/19- irritable, impulsive. Inconsistent in her compliance with abilify.     RECOMMENDATIONS:  - At [facility] patient is on 1:1CO. Less likely will she need a 1:1 at [facility]. Bed obtained on 1 north.   -Continue with Abilify 10mg PO  -For AGGRESSION Haldol 5mg q 6hrs prn IM/IV/PO

## 2021-03-19 NOTE — BH CONSULTATION LIAISON PROGRESS NOTE - NSBHFUPINTERVALCCFT_PSY_A_CORE
Remains under arrest. No beds at [facility].   Care coordinated with sister yesterday
Patient slept well. Received no prns. Denies any SI or HI. Denies any AH or VH. 
Still under arrest. Arraignment not done yet.   Discussed with Jolanta YOO
Patient is a 34 y/o AA female, domicile with mom in [place] , graduated from [employer] , with pmhx: PCOS, hx of 1 prior hospitalizations ( [facility] 3/9-3/13/20) not in current treatment,  no known hx of si/sa, no known h/o drug abuse or complicated withdrawal, no known hx of violence,  Pt BIB by [facility] under arrest for trespassing. Upon arrival to ED patient was agitated and required Ativan 2 mg Haldol 5 mg Im.
Refuses Abilify. Arraignment done today- released with future court date in May
Patient is a 34 y/o AA female, domicile with mom in [place] , graduated from [employer] , with pmhx: PCOS, hx of 1 prior hospitalizations ( [facility] 3/9-3/13/20) not in current treatment,  no known hx of si/sa, no known h/o drug abuse or complicated withdrawal, no known hx of violence,  Pt BIB by [facility] under arrest for trespassing. Upon arrival to ED patient was agitated and required Ativan 2 mg Haldol 5 mg Im.
Patient was a code grey and code flight. Required several security guards to bring her back to room.
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
## 2021-03-19 NOTE — BH INPATIENT PSYCHIATRY ASSESSMENT NOTE - NSBHASSESSSUMMFT_PSY_ALL_CORE
Semmentha Maisonneuve is a 36 y/o female, single, domiciled with mother and brother, graduated from [employer], PPHx of Bipolar Disorder I with last manic episode, 2 prior psych hospitalizations ([place] 10/2020 and [facility] 3/9-3/13/20), not in current treatment, no h/o SA/NSSIB/SI, no h/o substance use, no known hx of violence, PMHx PCOS, brought in to [facility] ED by [employer] under arrest for trespassing (arraigned), followed by [facility] CL for berna with psychosis, code grey and code flight on 3/18, transferred to [facility] for further evaluation of berna and psychosis.    Pt presents as pleasant, cooperative, and persuasive towards team. On interview, she is denying affective, manic, and psychotic Sx. Pt tends to withhold information about past psychiatric history until confronted or if inconsistencies in story are pointed out. She is hyperverbal although not pressured, overinclusive, tangential. Pt is illogical and overly legalistic but not combative or aggressive. Denies bettye delusions however per records Pt does not have a living aunt who she claimed to visit. Denies SI/HI. Pt has limited insight and judgement at this time. Pt willing to approach staff if Pt feels overwhelmed or unsafe. Agrees to have team call Pt's mother and best friend for collateral information and clarify h/o Bipolar 1 and Sx prior to admission -- working diagnosis is Bipolar 1 with recent berna episode in setting of medication nonadherence. Less likely psychosis due to medical condition or substance use given mostly unremarkable labs and no supporting hx.     Continued inpatient admission required for assessment, safety and stabilization.    Plan:  1. Legal: Admitted on 9.27  2. Safety: No reported SI/SIB/HI/VI currently on unit; appropriate for routine observation   - currently on elopement precautions given code grey and code flight at [facility], defer to primary team   3. Psychiatric:   - continue Abilify 10mg qd for psychosis   - PRN anxiety Atarax  - PRN agitation Haldol 5/Ativan 2/Benadryl 50 q6h PO/IM  4. Therapy: Group & milieu therapy  5. Medical:   - h/o PCOS - no medications   - follow A1C and lipid profile  6. Collateral: pending, provided verbal consent to speak with mother and best friend (see handoff)  7. Disposition: when stable   Semmentha Maisonneuve is a 34 y/o female, single, domiciled with mother and brother, graduated from [employer], PPHx of Bipolar Disorder I with last manic episode, 2 prior psych hospitalizations ([place] 10/2020 and [facility] 3/9-3/13/20), not in current treatment, no h/o SA/NSSIB/SI, no h/o substance use, no known hx of violence, PMHx PCOS, brought in to [facility] ED by [employer] under arrest for trespassing (arraigned), followed by [facility] CL for berna with psychosis, code grey and code flight on 3/18, transferred to [facility] for further evaluation of berna and psychosis.    Pt presents as pleasant, cooperative, and persuasive towards team. On interview, she is denying affective, manic, and psychotic Sx. Pt tends to withhold information about past psychiatric history until confronted or if inconsistencies in story are pointed out. She is hyperverbal although not pressured, overinclusive, tangential. Pt is illogical and overly legalistic but not combative or aggressive. Denies bettye delusions however per records Pt does not have a living aunt who she claimed to visit. Denies SI/HI. Pt has limited insight and judgement at this time. Pt willing to approach staff if Pt feels overwhelmed or unsafe. Agrees to have team call Pt's mother and best friend for collateral information and clarify h/o Bipolar 1 and Sx prior to admission -- working diagnosis is Bipolar 1 with current berna episode in setting of medication nonadherence. Less likely psychosis due to medical condition or substance use given mostly unremarkable labs and no supporting hx.     Continued inpatient admission required for assessment, safety and stabilization.    Plan:  1. Legal: Admitted on 9.27  2. Safety: No reported SI/SIB/HI/VI currently on unit; appropriate for routine observation   - currently on elopement precautions given code grey and code flight at [facility], defer to primary team   3. Psychiatric:   - continue Abilify 10mg qd for psychosis   - PRN anxiety Atarax  - PRN agitation Haldol 5/Ativan 2/Benadryl 50 q6h PO/IM  4. Therapy: Group & milieu therapy  5. Medical:   - h/o PCOS - no medications   - follow A1C and lipid profile  6. Collateral: pending, provided verbal consent to speak with mother and best friend (see handoff)  7. Disposition: when stable

## 2021-03-19 NOTE — BH CONSULTATION LIAISON PROGRESS NOTE - NSBHCONSFOLLOWNEEDS_PSY_ALL_CORE
Needs further psych safety assessment prior to discharge

## 2021-03-19 NOTE — BH INPATIENT PSYCHIATRY ASSESSMENT NOTE - OTHER PAST PSYCHIATRIC HISTORY (INCLUDE DETAILS REGARDING ONSET, COURSE OF ILLNESS, INPATIENT/OUTPATIENT TREATMENT)
[place] 3/2020 followed by outpatient treatment at [facility] History of 2 prior psych hospitalizations at [facility] 03/2020 and unknown name of hospital at [place] 10/2020  Not currently engaged in outpatient treatment, h/o medication noncompliance

## 2021-03-19 NOTE — BH INPATIENT PSYCHIATRY ASSESSMENT NOTE - DESCRIPTION
Single, no children, lives with mother, supportive sister, college graduate from [employer]. Single, no children, lives with mother, supportive sister, college graduate from [employer]. 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

## 2021-03-19 NOTE — PROGRESS NOTE ADULT - SUBJECTIVE AND OBJECTIVE BOX
Patient is a 35y old  Female who presents with a chief complaint of altered mental status (15 Mar 2021 11:30)      SUBJECTIVE / OVERNIGHT EVENTS:   No complaints, patient is eating and drinking well, has no lightheadedness, having BM's. Denies any SI/HI/ visual or auditory hallucinations.  Patient refused Abilify yesterday, reports she has "no psychiatric issues" she has not been taking the medications the whole time and has been fooling us and OP doctors.      MEDICATIONS  (STANDING):  ARIPiprazole 10 milliGRAM(s) Oral daily  enoxaparin Injectable 40 milliGRAM(s) SubCutaneous daily    MEDICATIONS  (PRN):  haloperidol     Tablet 5 milliGRAM(s) Oral every 6 hours PRN moderate agitation  haloperidol    Injectable 5 milliGRAM(s) IntraMuscular every 6 hours PRN severe agitation  ibuprofen  Tablet. 600 milliGRAM(s) Oral every 6 hours PRN Mild Pain (1 - 3), Moderate Pain (4 - 6)  LORazepam     Tablet 2 milliGRAM(s) Oral every 6 hours PRN Anxiety  LORazepam   Injectable 2 milliGRAM(s) IntraMuscular every 6 hours PRN severe anxiety/restlessness        Vital Signs Last 24 Hrs  T(C): 36.4 (16 Mar 2021 13:36), Max: 36.7 (15 Mar 2021 17:03)  T(F): 97.5 (16 Mar 2021 13:36), Max: 98.1 (15 Mar 2021 17:03)  HR: 81 (16 Mar 2021 13:36) (75 - 89)  BP: 106/67 (16 Mar 2021 13:36) (106/67 - 133/89)  BP(mean): --  RR: 17 (16 Mar 2021 13:36) (16 - 18)  SpO2: 98% (16 Mar 2021 13:36) (95% - 100%)    PHYSICAL EXAM:  GENERAL: obese, NAD   HEAD:  Atraumatic, Normocephalic  EYES: EOMI, PERRLA, conjunctiva and sclera clear  NECK: Supple, No JVD  CHEST/LUNG: Clear to auscultation bilaterally; No wheeze  HEART: Regular rate and rhythm; No murmurs, rubs, or gallops  ABDOMEN: Soft, Nontender, Nondistended; Bowel sounds present  EXTREMITIES:  2+ Peripheral Pulses, No clubbing, cyanosis, or edema  NEUROLOGY/PSYCHE: A&0x3, calm, cooperative, poor insight   SKIN: No rashes or lesions    LABS:    03-16    135  |  101  |  11  ----------------------------<  98  4.4   |  22  |  0.70    Ca    9.5      16 Mar 2021 06:55  Phos  3.6     03-16  Mg     2.0     03-16
Patient is a 35y old  Female who presents with a chief complaint of altered mental status (15 Mar 2021 11:30)      SUBJECTIVE / OVERNIGHT EVENTS: Pt admitted overnight. Currently denies cp, sob, N/V/D, A/V hallucinations, SI/HI.    MEDICATIONS  (STANDING):  ARIPiprazole 10 milliGRAM(s) Oral daily  enoxaparin Injectable 40 milliGRAM(s) SubCutaneous daily    MEDICATIONS  (PRN):  haloperidol     Tablet 5 milliGRAM(s) Oral every 6 hours PRN moderate agitation  haloperidol    Injectable 5 milliGRAM(s) IntraMuscular every 6 hours PRN severe agitation  ibuprofen  Tablet. 600 milliGRAM(s) Oral every 6 hours PRN Mild Pain (1 - 3), Moderate Pain (4 - 6)  LORazepam     Tablet 2 milliGRAM(s) Oral every 6 hours PRN Anxiety  LORazepam   Injectable 2 milliGRAM(s) IntraMuscular every 6 hours PRN severe anxiety/restlessness        Vital Signs Last 24 Hrs  T(C): 36.4 (16 Mar 2021 13:36), Max: 36.7 (15 Mar 2021 17:03)  T(F): 97.5 (16 Mar 2021 13:36), Max: 98.1 (15 Mar 2021 17:03)  HR: 81 (16 Mar 2021 13:36) (75 - 89)  BP: 106/67 (16 Mar 2021 13:36) (106/67 - 133/89)  BP(mean): --  RR: 17 (16 Mar 2021 13:36) (16 - 18)  SpO2: 98% (16 Mar 2021 13:36) (95% - 100%)    PHYSICAL EXAM:  GENERAL: obese, NAD   HEAD:  Atraumatic, Normocephalic  EYES: EOMI, PERRLA, conjunctiva and sclera clear  NECK: Supple, No JVD  CHEST/LUNG: Clear to auscultation bilaterally; No wheeze  HEART: Regular rate and rhythm; No murmurs, rubs, or gallops  ABDOMEN: Soft, Nontender, Nondistended; Bowel sounds present  EXTREMITIES:  2+ Peripheral Pulses, No clubbing, cyanosis, or edema  NEUROLOGY/PSYCHE: A&0x3, calm, cooperative, poor insight   SKIN: No rashes or lesions    LABS:    03-16    135  |  101  |  11  ----------------------------<  98  4.4   |  22  |  0.70    Ca    9.5      16 Mar 2021 06:55  Phos  3.6     03-16  Mg     2.0     03-16
Patient is a 35y old  Female who presents with a chief complaint of altered mental status (15 Mar 2021 11:30)      SUBJECTIVE / OVERNIGHT EVENTS: Pt admitted overnight.  No complaints, patient is eating and drinking well, has no lightheadedness, having BM's. Denies any SI/HI/ visual or auditory hallucinations.     MEDICATIONS  (STANDING):  ARIPiprazole 10 milliGRAM(s) Oral daily  enoxaparin Injectable 40 milliGRAM(s) SubCutaneous daily    MEDICATIONS  (PRN):  haloperidol     Tablet 5 milliGRAM(s) Oral every 6 hours PRN moderate agitation  haloperidol    Injectable 5 milliGRAM(s) IntraMuscular every 6 hours PRN severe agitation  ibuprofen  Tablet. 600 milliGRAM(s) Oral every 6 hours PRN Mild Pain (1 - 3), Moderate Pain (4 - 6)  LORazepam     Tablet 2 milliGRAM(s) Oral every 6 hours PRN Anxiety  LORazepam   Injectable 2 milliGRAM(s) IntraMuscular every 6 hours PRN severe anxiety/restlessness        Vital Signs Last 24 Hrs  T(C): 36.4 (16 Mar 2021 13:36), Max: 36.7 (15 Mar 2021 17:03)  T(F): 97.5 (16 Mar 2021 13:36), Max: 98.1 (15 Mar 2021 17:03)  HR: 81 (16 Mar 2021 13:36) (75 - 89)  BP: 106/67 (16 Mar 2021 13:36) (106/67 - 133/89)  BP(mean): --  RR: 17 (16 Mar 2021 13:36) (16 - 18)  SpO2: 98% (16 Mar 2021 13:36) (95% - 100%)    PHYSICAL EXAM:  GENERAL: obese, NAD   HEAD:  Atraumatic, Normocephalic  EYES: EOMI, PERRLA, conjunctiva and sclera clear  NECK: Supple, No JVD  CHEST/LUNG: Clear to auscultation bilaterally; No wheeze  HEART: Regular rate and rhythm; No murmurs, rubs, or gallops  ABDOMEN: Soft, Nontender, Nondistended; Bowel sounds present  EXTREMITIES:  2+ Peripheral Pulses, No clubbing, cyanosis, or edema  NEUROLOGY/PSYCHE: A&0x3, calm, cooperative, poor insight   SKIN: No rashes or lesions    LABS:    03-16    135  |  101  |  11  ----------------------------<  98  4.4   |  22  |  0.70    Ca    9.5      16 Mar 2021 06:55  Phos  3.6     03-16  Mg     2.0     03-16
Patient is a 35y old  Female who presents with a chief complaint of altered mental status (15 Mar 2021 11:30)      SUBJECTIVE / OVERNIGHT EVENTS:   No complaints, patient is eating and drinking well, has no lightheadedness, having BM's. Denies any SI/HI/ visual or auditory hallucinations.    MEDICATIONS  (STANDING):  ARIPiprazole 10 milliGRAM(s) Oral daily  enoxaparin Injectable 40 milliGRAM(s) SubCutaneous daily    MEDICATIONS  (PRN):  haloperidol     Tablet 5 milliGRAM(s) Oral every 6 hours PRN moderate agitation  haloperidol    Injectable 5 milliGRAM(s) IntraMuscular every 6 hours PRN severe agitation  ibuprofen  Tablet. 600 milliGRAM(s) Oral every 6 hours PRN Mild Pain (1 - 3), Moderate Pain (4 - 6)  LORazepam     Tablet 2 milliGRAM(s) Oral every 6 hours PRN Anxiety  LORazepam   Injectable 2 milliGRAM(s) IntraMuscular every 6 hours PRN severe anxiety/restlessness        Vital Signs Last 24 Hrs  T(C): 36.4 (16 Mar 2021 13:36), Max: 36.7 (15 Mar 2021 17:03)  T(F): 97.5 (16 Mar 2021 13:36), Max: 98.1 (15 Mar 2021 17:03)  HR: 81 (16 Mar 2021 13:36) (75 - 89)  BP: 106/67 (16 Mar 2021 13:36) (106/67 - 133/89)  BP(mean): --  RR: 17 (16 Mar 2021 13:36) (16 - 18)  SpO2: 98% (16 Mar 2021 13:36) (95% - 100%)    PHYSICAL EXAM:  GENERAL: obese, NAD   HEAD:  Atraumatic, Normocephalic  EYES: EOMI, PERRLA, conjunctiva and sclera clear  NECK: Supple, No JVD  CHEST/LUNG: Clear to auscultation bilaterally; No wheeze  HEART: Regular rate and rhythm; No murmurs, rubs, or gallops  ABDOMEN: Soft, Nontender, Nondistended; Bowel sounds present  EXTREMITIES:  2+ Peripheral Pulses, No clubbing, cyanosis, or edema  NEUROLOGY/PSYCHE: A&0x3, calm, cooperative, poor insight   SKIN: No rashes or lesions    LABS:    03-16    135  |  101  |  11  ----------------------------<  98  4.4   |  22  |  0.70    Ca    9.5      16 Mar 2021 06:55  Phos  3.6     03-16  Mg     2.0     03-16

## 2021-03-19 NOTE — DISCHARGE NOTE PROVIDER - HOSPITAL COURSE
35 year old female with history of PCOS, bipolar disorder was BIBA for psychiatric evaluation. Patient was found in a stranger's house. It was determined that patient requires inpatient hospitalization,   Patient found in a stranger's house and arrested, arraigned 3/18  Bipolar mood disorder, Continue with Abilify 10mg PO   - For agitation give either Haldol 5mg PO q6h or Ativan 2mg PO q6h PRN. Cont 1: 1  - currently calm, cooperative but with poor insight.   Headache- ibuprofen PRN.       medically stable for transfer to [facility] 35 year old female with history of PCOS, bipolar disorder was BIBA for psychiatric evaluation. Patient was found in a stranger's house.   Patient found in a stranger's house and arrested, arraigned 3/18. Seen by psych and recommend IP Psychiatric hospitalization.     # Bipolar mood disorder  - Continue with Abilify 10mg PO   - For agitation give either Haldol 5mg PO q6h or Ativan 2mg PO q6h PRN. Cont 1: 1  - currently calm, cooperative but with poor insight.   Headache- ibuprofen PRN.       medically stable for transfer to [facility]

## 2021-03-19 NOTE — BH CONSULTATION LIAISON PROGRESS NOTE - NSBHMSEBEHAV_PSY_A_CORE
Cooperative
Cooperative/Uncooperative
Cooperative
Cooperative
Uncooperative
Uncooperative
Cooperative

## 2021-03-19 NOTE — BH INPATIENT PSYCHIATRY ASSESSMENT NOTE - MSE UNSTRUCTURED FT
The patient appears stated age, appropriate hygiene, showered, and dressed in hospital gown.  The patient was calm, cooperative with the interview and maintained appropriate eye contact with appropriate relatedness, although oddly related when discussing prior psych hx.  No psychomotor agitation or retardation noted, no abnormal movements.  Steady gait observed.  The patient's speech was hyperverbal, not pressured, normal in tone, and volume.  The patient's mood is "fine."  Affect is euthymic, stable and appropriate.  Thought process is overinclusive, circumstantial, and at times tangential and illogical. Thought content notable for pan-denying of symptoms, inconsistent reporting of history. No bettye delusional content.  Denies any suicidal or homicidal ideation, intent, or plan. Denies hallucinations.  Cognition grossly intact, AAOx3. Insight is poor.  Judgment is poor.  Impulse control has been fair on the unit.

## 2021-03-19 NOTE — BH CONSULTATION LIAISON PROGRESS NOTE - NSBHCHARTREVIEWLAB_PSY_A_CORE FT
03-16    135  |  101  |  11  ----------------------------<  98  4.4   |  22  |  0.70    Ca    9.5      16 Mar 2021 06:55  Phos  3.6     03-16  Mg     2.0     03-16    
CBC Full  -  ( 18 Mar 2021 06:19 )  WBC Count : 10.92 K/uL  RBC Count : 4.74 M/uL  Hemoglobin : 12.1 g/dL  Hematocrit : 37.3 %  Platelet Count - Automated : 441 K/uL  Mean Cell Volume : 78.7 fL  Mean Cell Hemoglobin : 25.5 pg  Mean Cell Hemoglobin Concentration : 32.4 gm/dL  Auto Neutrophil # : x  Auto Lymphocyte # : x  Auto Monocyte # : x  Auto Eosinophil # : x  Auto Basophil # : x  Auto Neutrophil % : x  Auto Lymphocyte % : x  Auto Monocyte % : x  Auto Eosinophil % : x  Auto Basophil % : x  03-18    137  |  100  |  12  ----------------------------<  102<H>  4.5   |  22  |  0.75    Ca    9.5      18 Mar 2021 06:19  Phos  4.0     03-18  Mg     2.1     03-18    
03-12    137  |  103  |  11  ----------------------------<  93  3.5   |  22  |  0.86    Ca    9.4      12 Mar 2021 16:00    TPro  7.8  /  Alb  4.1  /  TBili  0.4  /  DBili  x   /  AST  22  /  ALT  20  /  AlkPhos  63  03-12    CBC Full  -  ( 12 Mar 2021 16:00 )  WBC Count : 10.44 K/uL  RBC Count : 4.57 M/uL  Hemoglobin : 11.6 g/dL  Hematocrit : 36.4 %  Platelet Count - Automated : 375 K/uL  Mean Cell Volume : 79.6 fL  Mean Cell Hemoglobin : 25.4 pg  Mean Cell Hemoglobin Concentration : 31.9 gm/dL  Auto Neutrophil # : 7.48 K/uL  Auto Lymphocyte # : 2.01 K/uL  Auto Monocyte # : 0.81 K/uL  Auto Eosinophil # : 0.08 K/uL  Auto Basophil # : 0.03 K/uL  Auto Neutrophil % : 71.5 %  Auto Lymphocyte % : 19.3 %  Auto Monocyte % : 7.8 %  Auto Eosinophil % : 0.8 %  Auto Basophil % : 0.3 %  
03-16    135  |  101  |  11  ----------------------------<  98  4.4   |  22  |  0.70    Ca    9.5      16 Mar 2021 06:55  Phos  3.6     03-16  Mg     2.0     03-16    
CBC Full  -  ( 18 Mar 2021 06:19 )  WBC Count : 10.92 K/uL  RBC Count : 4.74 M/uL  Hemoglobin : 12.1 g/dL  Hematocrit : 37.3 %  Platelet Count - Automated : 441 K/uL  Mean Cell Volume : 78.7 fL  Mean Cell Hemoglobin : 25.5 pg  Mean Cell Hemoglobin Concentration : 32.4 gm/dL  Auto Neutrophil # : x  Auto Lymphocyte # : x  Auto Monocyte # : x  Auto Eosinophil # : x  Auto Basophil # : x  Auto Neutrophil % : x  Auto Lymphocyte % : x  Auto Monocyte % : x  Auto Eosinophil % : x  Auto Basophil % : x  03-18    137  |  100  |  12  ----------------------------<  102<H>  4.5   |  22  |  0.75    Ca    9.5      18 Mar 2021 06:19  Phos  4.0     03-18  Mg     2.1     03-18

## 2021-03-19 NOTE — BH INPATIENT PSYCHIATRY ASSESSMENT NOTE - NSSUICRSKFACTOR_PSY_ALL_CORE
Current and Past Psychiatric Diagnoses Current and Past Psychiatric Diagnoses/Treatment Related Factors

## 2021-03-19 NOTE — BH INPATIENT PSYCHIATRY ASSESSMENT NOTE - RISK ASSESSMENT
pt is at elevated risk given acute mood sx and psychotic sx. Risk factors include active mood sx, active psychotic sx, , not in treatment. Risk factors will be mitigated by hospitalizations.  Patient has protective factors including family support, no hx of sa. no substance use. Pt is at elevated risk given acute mood sx and psychotic sx. Risk factors include prior psych hospitalizations, poor insight on Sx, h/o medication nonadherence, not in treatment. Risk factors will be mitigated by hospitalization for safety and stabilization. Protective factors include family support, no hx of sa. no substance use.

## 2021-03-19 NOTE — DISCHARGE NOTE NURSING/CASE MANAGEMENT/SOCIAL WORK - PATIENT PORTAL LINK FT
You can access the FollowMyHealth Patient Portal offered by [facility] by registering at the following website: [email or web address]. By joining [employer]’s FollowMyHealth portal, you will also be able to view your health information using other applications (apps) compatible with our system.

## 2021-03-19 NOTE — BH INPATIENT PSYCHIATRY ASSESSMENT NOTE - NSTXMANICGOAL_PSY_ALL_CORE
Exhibit a substantial reduction in elated/angry acting out, and pressured speech that prevents mutual conversation

## 2021-03-19 NOTE — BH CONSULTATION LIAISON PROGRESS NOTE - NSBHCHARTREVIEWVS_PSY_A_CORE FT
Vital Signs Last 24 Hrs  T(C): 36.9 (15 Mar 2021 07:27), Max: 36.9 (14 Mar 2021 07:40)  T(F): 98.5 (15 Mar 2021 07:27), Max: 98.5 (15 Mar 2021 07:27)  HR: 80 (15 Mar 2021 07:27) (70 - 99)  BP: 117/63 (15 Mar 2021 07:27) (115/62 - 138/78)  BP(mean): --  RR: 15 (15 Mar 2021 07:27) (14 - 18)  SpO2: 100% (15 Mar 2021 07:27) (100% - 100%)
Vital Signs Last 24 Hrs  T(C): 36.9 (17 Mar 2021 06:00), Max: 37 (16 Mar 2021 20:08)  T(F): 98.5 (17 Mar 2021 06:00), Max: 98.6 (16 Mar 2021 20:08)  HR: 88 (17 Mar 2021 06:00) (78 - 88)  BP: 139/79 (17 Mar 2021 06:00) (106/67 - 147/76)  BP(mean): --  RR: 18 (17 Mar 2021 06:00) (17 - 18)  SpO2: 98% (17 Mar 2021 06:00) (98% - 100%)
Vital Signs Last 24 Hrs  T(C): 36.7 (18 Mar 2021 13:00), Max: 36.9 (18 Mar 2021 06:15)  T(F): 98.1 (18 Mar 2021 13:00), Max: 98.4 (18 Mar 2021 06:15)  HR: 100 (18 Mar 2021 13:00) (70 - 100)  BP: 137/80 (18 Mar 2021 13:00) (137/80 - 143/84)  BP(mean): --  RR: 19 (18 Mar 2021 13:00) (18 - 20)  SpO2: 95% (18 Mar 2021 13:00) (95% - 100%)
Vital Signs Last 24 Hrs  T(C): 36.7 (15 Mar 2021 12:52), Max: 36.9 (15 Mar 2021 07:27)  T(F): 98.1 (15 Mar 2021 12:52), Max: 98.5 (15 Mar 2021 07:27)  HR: 75 (15 Mar 2021 12:52) (70 - 99)  BP: 127/61 (15 Mar 2021 12:52) (115/62 - 147/73)  BP(mean): --  RR: 16 (15 Mar 2021 12:52) (14 - 16)  SpO2: 100% (15 Mar 2021 12:52) (98% - 100%)  Complete Blood Count + Automated Diff (03.12.21 @ 16:00)    IANC: 7.48: IANC (instrument absolute neutrophil count) is based on the instrument  calculation which may differ from ANC (manual absolute neutrophil count)  since it is based on the calculation from a manual differential. K/uL    Nucleated RBC #: 0.00 K/uL    WBC Count: 10.44 K/uL    RBC Count: 4.57 M/uL    Hemoglobin: 11.6 g/dL    Hematocrit: 36.4 %    Mean Cell Volume: 79.6 fL    Mean Cell Hemoglobin: 25.4 pg    Mean Cell Hemoglobin Conc: 31.9 gm/dL    Red Cell Distrib Width: 16.6 %    Platelet Count - Automated: 375 K/uL    Auto Neutrophil #: 7.48 K/uL    Auto Lymphocyte #: 2.01 K/uL    Auto Monocyte #: 0.81 K/uL    Auto Eosinophil #: 0.08 K/uL    Auto Basophil #: 0.03 K/uL    Auto Neutrophil %: 71.5: Differential percentages must be correlated with absolute numbers for  clinical significance. %    Auto Lymphocyte %: 19.3 %    Auto Monocyte %: 7.8 %    Auto Eosinophil %: 0.8 %    Auto Basophil %: 0.3 %    Auto Immature Granulocyte %: 0.3: (Includes meta, myelo and promyelocytes) %    Nucleated RBC: 0 /100 WBCs  
Vital Signs Last 24 Hrs  T(C): 36.9 (14 Mar 2021 07:40), Max: 37 (14 Mar 2021 03:30)  T(F): 98.4 (14 Mar 2021 07:40), Max: 98.6 (14 Mar 2021 03:30)  HR: 91 (14 Mar 2021 07:40) (55 - 91)  BP: 134/62 (14 Mar 2021 07:40) (109/50 - 143/81)  BP(mean): --  RR: 18 (14 Mar 2021 07:40) (16 - 18)  SpO2: 100% (14 Mar 2021 07:40) (100% - 100%)
Vital Signs Last 24 Hrs  T(C): 36.4 (16 Mar 2021 13:36), Max: 36.7 (15 Mar 2021 17:03)  T(F): 97.5 (16 Mar 2021 13:36), Max: 98.1 (15 Mar 2021 17:03)  HR: 81 (16 Mar 2021 13:36) (75 - 89)  BP: 106/67 (16 Mar 2021 13:36) (106/67 - 133/89)  BP(mean): --  RR: 17 (16 Mar 2021 13:36) (16 - 18)  SpO2: 98% (16 Mar 2021 13:36) (95% - 100%)
Vital Signs Last 24 Hrs  T(C): 36.8 (19 Mar 2021 11:30), Max: 36.8 (19 Mar 2021 11:30)  T(F): 98.3 (19 Mar 2021 11:30), Max: 98.3 (19 Mar 2021 11:30)  HR: 96 (19 Mar 2021 11:30) (81 - 99)  BP: 140/66 (19 Mar 2021 11:30) (115/70 - 140/66)  BP(mean): --  RR: 18 (19 Mar 2021 11:30) (18 - 18)  SpO2: 100% (19 Mar 2021 11:30) (95% - 100%)

## 2021-03-20 LAB
A1C WITH ESTIMATED AVERAGE GLUCOSE RESULT: 5.8 % — HIGH (ref 4–5.6)
CHOLEST SERPL-MCNC: 172 MG/DL — SIGNIFICANT CHANGE UP
ESTIMATED AVERAGE GLUCOSE: 120 MG/DL — HIGH (ref 68–114)
HDLC SERPL-MCNC: 45 MG/DL — LOW
LIPID PNL WITH DIRECT LDL SERPL: 105 MG/DL — HIGH
NON HDL CHOLESTEROL: 127 MG/DL — SIGNIFICANT CHANGE UP
TRIGL SERPL-MCNC: 109 MG/DL — SIGNIFICANT CHANGE UP

## 2021-03-20 PROCEDURE — 99232 SBSQ HOSP IP/OBS MODERATE 35: CPT

## 2021-03-20 PROCEDURE — 99223 1ST HOSP IP/OBS HIGH 75: CPT

## 2021-03-20 RX ADMIN — ARIPIPRAZOLE 10 MILLIGRAM(S): 15 TABLET ORAL at 08:54

## 2021-03-20 NOTE — PSYCHIATRIC REHAB INITIAL EVALUATION - NSBHALCSUBTREAT_PSY_ALL_CORE
Patient was referred to [facility] during her last admission in 3/2020. According to medical records, patient has not followed up with treatment since 4/2020./Outpatient clinic (specify)

## 2021-03-20 NOTE — BH INPATIENT PSYCHIATRY PROGRESS NOTE - NSBHASSESSSUMMFT_PSY_ALL_CORE
Patient is a 34 y/o female, single, domiciled with mother and brother, graduated from [employer], PPHx of Bipolar Disorder I with last manic episode, 2 prior psych hospitalizations ([place] 10/2020 and [facility] 3/9-3/13/20), not in current treatment, no h/o SA/NSSIB/SI, no h/o substance use, no known hx of violence, PMHx PCOS, brought in to [facility] ED by [employer] under arrest for trespassing (arraigned, had been admitted to [facility] while awaiting), followed by [facility] CL for berna with psychosis, code grey and code flight on 3/18, transferred to [facility] for further evaluation of berna and psychosis. Currently no complaints.  Plan:  Admit to [facility] inpt pych, no need to CO 1:1 at this time.  Psych: continue abilify 10 mg qd  Medical: stable
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
## 2021-03-21 PROCEDURE — 99232 SBSQ HOSP IP/OBS MODERATE 35: CPT

## 2021-03-21 RX ADMIN — ARIPIPRAZOLE 10 MILLIGRAM(S): 15 TABLET ORAL at 09:42

## 2021-03-21 NOTE — BH INPATIENT PSYCHIATRY PROGRESS NOTE - NSBHASSESSSUMMFT_PSY_ALL_CORE
Patient is a 36 y/o female, single, domiciled with mother and brother, graduated from [employer], PPHx of Bipolar Disorder I with last manic episode, 2 prior psych hospitalizations ([place] 10/2020 and [facility] 3/9-3/13/20), not in current treatment, no h/o SA/NSSIB/SI, no h/o substance use, no known hx of violence, PMHx PCOS, brought in to [facility] ED by [employer] under arrest for trespassing (arraigned, had been admitted to [facility] while awaiting), followed by [facility] CL for berna with psychosis, code grey and code flight on 3/18, transferred to [facility] for further evaluation of berna and psychosis. Today pt was seen in the unit and has no complaints, denies SI/HI, psychotic or mood sx's.

## 2021-03-22 PROCEDURE — 99232 SBSQ HOSP IP/OBS MODERATE 35: CPT

## 2021-03-22 RX ORDER — METHOCARBAMOL 500 MG/1
500 TABLET, FILM COATED ORAL
Refills: 0 | Status: DISCONTINUED | OUTPATIENT
Start: 2021-03-22 | End: 2021-03-23

## 2021-03-22 RX ADMIN — METHOCARBAMOL 500 MILLIGRAM(S): 500 TABLET, FILM COATED ORAL at 22:12

## 2021-03-22 RX ADMIN — ARIPIPRAZOLE 10 MILLIGRAM(S): 15 TABLET ORAL at 09:43

## 2021-03-22 NOTE — BH SOCIAL WORK INITIAL PSYCHOSOCIAL EVALUATION - OTHER PAST PSYCHIATRIC HISTORY (INCLUDE DETAILS REGARDING ONSET, COURSE OF ILLNESS, INPATIENT/OUTPATIENT TREATMENT)
Pt. has a history of Bipolar 1 d/o with two prior psych. hospitalizations, one in [facility], 10/2020 and [facility] 3/9/20-3/13/20.  Currently not in treatment.

## 2021-03-22 NOTE — BH INPATIENT PSYCHIATRY PROGRESS NOTE - CASE SUMMARY
This is the 3-rd episode of berna for this patient ( info from family collateral supports an impression that patient's symptoms met criteria for manic episodes.)  She is benefitting from hospital structured environment and maintains fair control, but still decreased sleep, increased productivity of speech and self esteem, and poor insight.  Tolerates abilify well. plan to continue trial.
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
## 2021-03-22 NOTE — BH INPATIENT PSYCHIATRY PROGRESS NOTE - MODIFICATIONS
Patient interviewed and evaluated on morning rounds with resident present to follow up on symptoms and the case has been reviewed with resident and treatment team this morning. I have reviewed the resident's progress note and the mental status examination and I agree with its contents and treatment plan and I have made changes to the note when needed and as indicated.

## 2021-03-23 PROCEDURE — 99232 SBSQ HOSP IP/OBS MODERATE 35: CPT

## 2021-03-23 RX ORDER — ARIPIPRAZOLE 15 MG/1
400 TABLET ORAL ONCE
Refills: 0 | Status: COMPLETED | OUTPATIENT
Start: 2021-03-23 | End: 2021-03-23

## 2021-03-23 RX ORDER — LIDOCAINE 4 G/100G
1 CREAM TOPICAL DAILY
Refills: 0 | Status: DISCONTINUED | OUTPATIENT
Start: 2021-03-23 | End: 2021-03-25

## 2021-03-23 RX ADMIN — METHOCARBAMOL 500 MILLIGRAM(S): 500 TABLET, FILM COATED ORAL at 09:15

## 2021-03-23 RX ADMIN — ARIPIPRAZOLE 400 MILLIGRAM(S): 15 TABLET ORAL at 16:17

## 2021-03-23 RX ADMIN — ARIPIPRAZOLE 10 MILLIGRAM(S): 15 TABLET ORAL at 09:15

## 2021-03-23 NOTE — BH INPATIENT PSYCHIATRY PROGRESS NOTE - NSBHASSESSSUMMFT_PSY_ALL_CORE
Patient is a 36 y/o female, single, domiciled with mother and brother, graduated from [facility] [employer], PPHx of Bipolar Disorder I with last manic episode, 2 prior psych hospitalizations ([place] 10/2020 and [facility] 3/9-3/13/20), not in current treatment, no h/o SA/NSSIB/SI, no h/o substance use, no known hx of violence, PMHx PCOS, brought in to [facility] ED by [employer] under arrest for trespassing (arraigned, had been admitted to [facility] while awaiting), followed by [facility] CL for berna with psychosis, code grey and code flight on 3/18, transferred to [facility] for further evaluation of berna and psychosis.     Patient has been in appropriate behavioral control on the unit, however notably hyperverbal and illogical with poor insight. She reports prior effectiveness on Abilify and has been restarted on this regimen. Tolerating appropriately. Will continue Abilify and initiate RODRIGUEZ as patient in agreement and approval has been obtained from pharmacy,     Plan:  2PC Legal Status  Routine Observation  Continue Abilify 10mg daily  Start Abilify Maintena 400mg IM today, approved by vivo  Start Lidocaine patch daily, dc Robaxin as patient found ineffective  Collateral received from mother  Dispo pending stabilization

## 2021-03-23 NOTE — BH INPATIENT PSYCHIATRY PROGRESS NOTE - CASE SUMMARY
This is the 3-rd episode of berna for this patient ( info from family collateral supports an impression that patient's symptoms met criteria for manic episodes.)  She is benefitting from hospital structured environment and maintains fair control, but still decreased sleep, increased productivity of speech and self esteem, and poor insight.  Tolerates abilify well. plan to continue trial. This is the 3-rd episode of berna for this patient ( info from family collateral supports an impression that patient's symptoms met criteria for manic episodes.) Pt's insight is poor and she displays hyper verbosity and heightened energy, with decreased sleep.  She is benefitting from hospital structured environment and maintains fair control.  Tolerates Abilify well. plan to continue trial and encourage pt to accept RODRIGUEZ..

## 2021-03-24 PROCEDURE — 99232 SBSQ HOSP IP/OBS MODERATE 35: CPT

## 2021-03-24 PROCEDURE — 90834 PSYTX W PT 45 MINUTES: CPT

## 2021-03-24 RX ADMIN — ARIPIPRAZOLE 10 MILLIGRAM(S): 15 TABLET ORAL at 10:47

## 2021-03-24 RX ADMIN — LIDOCAINE 1 PATCH: 4 CREAM TOPICAL at 10:47

## 2021-03-24 NOTE — BH INPATIENT PSYCHIATRY PROGRESS NOTE - NSBHASSESSSUMMFT_PSY_ALL_CORE
Patient is a 34 y/o female, single, domiciled with mother and brother, graduated from [employer], PPHx of Bipolar Disorder I with last manic episode, 2 prior psych hospitalizations ([place] 10/2020 and [facility] 3/9-3/13/20), not in current treatment, no h/o SA/NSSIB/SI, no h/o substance use, no known hx of violence, PMHx PCOS, brought in to [facility] ED by [employer] under arrest for trespassing (arraigned, had been admitted to [facility] while awaiting), followed by [facility] CL for berna with psychosis, code grey and code flight on 3/18, transferred to [facility] for further evaluation of berna and psychosis.     Patient has been in appropriate behavioral control on the unit, however notably hyperverbal and illogical with poor insight. She reports prior effectiveness on Abilify and has been restarted on this regimen. Tolerating appropriately. Will continue Abilify and initiated RODRIGUEZ as patient in agreement and approval obtained from pharmacy.    Plan:  2PC Legal Status  Routine Observation  Continue Abilify 10mg daily  Received Abilify Maintena 400mg IM 3/24, approved by vivo  Continue Lidocaine patch daily, dc Robaxin as patient found ineffective  Collateral received from mother  Dispo pending stabilization
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
## 2021-03-24 NOTE — BH PSYCHOLOGY - CLINICIAN PSYCHOTHERAPY NOTE - NSTXMANICGOALOTHER_PSY_ALL_CORE
Demonstrate and utilize two healthy coping skills for improved symptom management within seven days.

## 2021-03-24 NOTE — BH INPATIENT PSYCHIATRY PROGRESS NOTE - CASE SUMMARY
This is the 3-rd episode of berna for this patient ( info from family collateral supports an impression that patient's symptoms met criteria for manic episodes.) Pt's insight is poor and she displays hyper verbosity and heightened energy, with decreased sleep.  She is benefitting from hospital structured environment and maintains fair control.  Tolerates Abilify well. plan to continue trial and encourage pt to accept RODRIGUEZ.. This is the 3-rd episode of berna for this patient ( info from family collateral supports an impression that patient's symptoms met criteria for manic episodes.) Pt's insight is impaired and she displays hyper verbosity and heightened energy, with decreased sleep. She accepted abilify Maintena yesterday and so far tolerates it well. No TD/EPS/akathisia.  She is benefitting from hospital structured environment and maintains fair control.  Tolerates Abilify well. plan to continue trial and work on improvinf insight and judgment, including efforts at family work.
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
## 2021-03-25 PROCEDURE — 99231 SBSQ HOSP IP/OBS SF/LOW 25: CPT

## 2021-03-25 RX ADMIN — ARIPIPRAZOLE 10 MILLIGRAM(S): 15 TABLET ORAL at 08:59

## 2021-03-25 NOTE — BH TREATMENT PLAN - NSTXCAREGIVERPARTICIPATE_PSY_P_CORE
Family/Caregiver participated in identification of needs/problems/goals for treatment/Family/Caregiver participated in defining interventions/Family/Caregiver participated in development of after care plan

## 2021-03-25 NOTE — BH DISCHARGE NOTE NURSING/SOCIAL WORK/PSYCH REHAB - NSDCVIVACCINE_GEN_ALL_CORE_FT
No Vaccines Administered. Severe acute respiratory syndrome coronavirus 2 (SARS-CoV-2) (Coronavirus disease [COVID-19]) vaccine , 2021/3/26 12:13 , Estephania Ya (RN)

## 2021-03-25 NOTE — BH DISCHARGE NOTE NURSING/SOCIAL WORK/PSYCH REHAB - NSDCPRGOAL_PSY_ALL_CORE
Patient made progress with accomplishing her rehabilitation goals on the unit. Patient was able to identify coping skills such as yoga, meditation, reading, writing, dancing, and watching television. Patient also identified her early warning signs of relapse such as migraines, blurred vision and heart racing. Patient attended daily groups and was visible on the unit and social with peers. Patient maintained fair ADLs. Patient was compliant with medication and agreeable to RODRIGUEZ. Patient is encouraged to maintain compliance with medication.   Patient was able to complete a safety plan, press beto.

## 2021-03-25 NOTE — BH DISCHARGE NOTE NURSING/SOCIAL WORK/PSYCH REHAB - NSDCPRRECOMMEND_PSY_ALL_CORE
After discharge, patient will attend the [facility] for ongoing medication management, support, and psychotherapy.

## 2021-03-25 NOTE — BH DISCHARGE NOTE NURSING/SOCIAL WORK/PSYCH REHAB - NSCDUDCCRISIS_PSY_A_CORE
[facility]  1 (130) [facility]-WELL ([phone])  Text "WELL" to 69645  Website: www.[employer]/.[employer] 1 (948) 001-SQHA (9004) Website: www.safehorizon.org/.National Suicide Prevention Lifeline 9 (011) 790-1004/.  [employer]  1 (978) LIFENET (067-6329)/.  [facility]  [address] [place] [address] [phone]   Hours:  Monday through Friday from 9 AM to 3 PM/.  [employer] - [facility] Crisis Line  8 [phone], Option 1

## 2021-03-25 NOTE — BH TREATMENT PLAN - NSTXDCOPLKINTERSW_PSY_ALL_CORE
SW to provide psychoeducation, support and discharge planning.  Collateral supports to be contacted accordingly.

## 2021-03-25 NOTE — BH DISCHARGE NOTE NURSING/SOCIAL WORK/PSYCH REHAB - PATIENT PORTAL LINK FT
You can access the FollowMyHealth Patient Portal offered by [facility] by registering at the following website: [email or web address]. By joining [facility]’s FollowMyHealth portal, you will also be able to view your health information using other applications (apps) compatible with our system.

## 2021-03-25 NOTE — BH TREATMENT PLAN - NSTXMANICINTERPR_PSY_ALL_CORE
During this hospitalization, patient will identify and utilize two effective coping skills for improved symptom management within seven days.

## 2021-03-25 NOTE — BH DISCHARGE NOTE NURSING/SOCIAL WORK/PSYCH REHAB - NSBHDCADDR1FT_A_CORE
remote Treatment is remote***Please inform the patient that they will be receiving a call from our registration team the day before to get consent for treatment and to verify demographic information. You will be contact by phone/email for your appointment.

## 2021-03-25 NOTE — BH INPATIENT PSYCHIATRY PROGRESS NOTE - CASE SUMMARY
This is the 3-rd episode of berna for this patient ( info from family collateral supports an impression that patient's symptoms met criteria for manic episodes.) Pt's insight is impaired and she displays hyper verbosity and heightened energy, with decreased sleep. She accepted abilify Maintena yesterday and so far tolerates it well. No TD/EPS/akathisia.  She is benefitting from hospital structured environment and maintains fair control.  Tolerates Abilify well. plan to continue trial and work on improvinf insight and judgment, including efforts at family work. This is the 3-rd episode of berna for this patient ( info from family collateral supports an impression that patient's symptoms met criteria for manic episodes.) Pt's insight is impaired and she displays still some affective instability, increased verbal productivity and pressure of speech, heightened energy, with decreased sleep. She accepted abilify Maintena yesterday and so far tolerates it well. No TD/EPS/akathisia.  She is benefitting from hospital structured environment and maintains fair control.  Tolerates Abilify well. plan to continue trial and work on improvinf insight and judgment, including efforts at family work.

## 2021-03-25 NOTE — BH INPATIENT PSYCHIATRY PROGRESS NOTE - NSBHASSESSSUMMFT_PSY_ALL_CORE
Patient is a 36 y/o female, single, domiciled with mother and brother, graduated from [employer], PPHx of Bipolar Disorder I with last manic episode, 2 prior psych hospitalizations ([place] 10/2020 and [facility] 3/9-3/13/20), not in current treatment, no h/o SA/NSSIB/SI, no h/o substance use, no known hx of violence, PMHx PCOS, brought in to [facility] ED by [employer] under arrest for trespassing (arraigned, had been admitted to [facility] while awaiting), followed by [facility] CL for berna with psychosis, code grey and code flight on 3/18, transferred to [facility] for further evaluation of berna and psychosis.     Patient has been in appropriate behavioral control on the unit, however notably hyperverbal and illogical with poor insight. She reports prior effectiveness on Abilify and has been restarted on this regimen. Tolerating appropriately. Will continue Abilify and initiated RODRIGUEZ as patient in agreement and approval obtained from pharmacy.    Plan:  2PC Legal Status  Routine Observation  Continue Abilify 10mg daily  Received Abilify Maintena 400mg IM 3/24, approved by vivo  Collateral received from mother  Dispo pending stabilization

## 2021-03-26 VITALS — TEMPERATURE: 98 F | HEART RATE: 78 BPM | SYSTOLIC BLOOD PRESSURE: 118 MMHG | DIASTOLIC BLOOD PRESSURE: 66 MMHG

## 2021-03-26 PROCEDURE — 99239 HOSP IP/OBS DSCHRG MGMT >30: CPT | Mod: 25

## 2021-03-26 PROCEDURE — 90853 GROUP PSYCHOTHERAPY: CPT

## 2021-03-26 RX ORDER — JNJ-78436735 50000000000 [PFU]/.5ML
0.5 SUSPENSION INTRAMUSCULAR ONCE
Refills: 0 | Status: COMPLETED | OUTPATIENT
Start: 2021-03-26 | End: 2021-03-26

## 2021-03-26 RX ORDER — ARIPIPRAZOLE 15 MG/1
1 TABLET ORAL
Qty: 11 | Refills: 0
Start: 2021-03-26 | End: 2021-04-05

## 2021-03-26 RX ORDER — INFLUENZA VIRUS VACCINE 15; 15; 15; 15 UG/.5ML; UG/.5ML; UG/.5ML; UG/.5ML
0.5 SUSPENSION INTRAMUSCULAR ONCE
Refills: 0 | Status: COMPLETED | OUTPATIENT
Start: 2021-03-26 | End: 2021-03-26

## 2021-03-26 RX ADMIN — JNJ-78436735 0.5 MILLILITER(S): 50000000000 SUSPENSION INTRAMUSCULAR at 12:13

## 2021-03-26 RX ADMIN — ARIPIPRAZOLE 10 MILLIGRAM(S): 15 TABLET ORAL at 10:32

## 2021-03-26 NOTE — BH INPATIENT PSYCHIATRY PROGRESS NOTE - NSCGIIMPROVESX_PSY_ALL_CORE
3 = Minimally improved - slightly better with little or no clinically meaningful reduction of symptoms.  Represents very little change in basic clinical status, level of care, or functional capacity. 2 = Much improved - notably better with signficant reduction of symptoms; increase in the level of functioning but some symptoms remain

## 2021-03-26 NOTE — BH INPATIENT PSYCHIATRY PROGRESS NOTE - NSBHASSESSSUMMFT_PSY_ALL_CORE
Patient is a 36 y/o female, single, domiciled with mother and brother, graduated from [employer], PPHx of Bipolar Disorder I with last manic episode, 2 prior psych hospitalizations ([place] 10/2020 and [facility] 3/9-3/13/20), not in current treatment, no h/o SA/NSSIB/SI, no h/o substance use, no known hx of violence, PMHx PCOS, brought in to [facility] ED by [employer] under arrest for trespassing (arraigned, had been admitted to [facility] while awaiting), followed by [facility] CL for berna with psychosis, code grey and code flight on 3/18, transferred to [facility] for further evaluation of berna and psychosis.     Patient has been in appropriate behavioral control on the unit, however notably hyperverbal and illogical with poor insight. She reports prior effectiveness on Abilify and has been restarted on this regimen. Tolerating appropriately. Will continue Abilify and initiated RODRIGUEZ as patient in agreement and approval obtained from pharmacy. Patient has been in full behavioral control and participating in all groups, motivated to learn, and with somewhat improved insight. As such, she is appropriate for discharge.     Plan:  [facility] Legal Status  Routine Observation  Continue Abilify 10mg daily  Received Abilify Maintena 400mg IM 3/24, approved by vivo  Collateral received from mother  COVID vaccine received today s/p consent, pt aware of risks/benefits  Discharge today with 11d Abilify 10mg daily, next RODRIGUEZ due 4/23   Patient is a 36 y/o female, single, domiciled with mother and brother, graduated from [employer], PPHx of Bipolar Disorder I with last manic episode, 2 prior psych hospitalizations ([place] 10/2020 and [facility] 3/9-3/13/20), not in current treatment, no h/o SA/NSSIB/SI, no h/o substance use, no known hx of violence, PMHx PCOS, brought in to [facility] ED by [employer] under arrest for trespassing (arraigned, had been admitted to [facility] while awaiting), followed by [facility] CL for berna with psychosis, code grey and code flight on 3/18, transferred to [facility] for further evaluation of berna and psychosis.     Patient has been in appropriate behavioral control on the unit, however notably hyperverbal and illogical with poor insight. She reports prior effectiveness on Abilify and has been restarted on this regimen. Tolerating appropriately. Will continue Abilify and initiated RODRIGUEZ as patient in agreement and approval obtained from pharmacy. Patient has been in full behavioral control and participating in all groups, motivated to learn, and with somewhat improved insight. As such, she is appropriate for discharge.     Suicidal risk assessment was performed on the day of discharge. The patient is at elevated chronic risk of self-harm due to an underlying mood disorder. Patient is not actively suicidal, making them appropriate for less restrictive treatment as an outpatient without need for continued inpatient hospitalization. Protective factors for suicide include lack of prior SA/SIB, lack of SI, sobriety, RODRIGUEZ, and family support. Risk factors include bipolar disorder, history of nonadherence, history of irritability, and impulsivity. Immediate risk was minimized by inpatient admission to a safe environment with appropriate supervision and limited access to lethal means. Future risk was minimized before discharge by treatment of manic symptoms, maximizing outpatient support, providing relevant patient education, discussing emergency procedures, and ensuring close follow-up. Patient denies all suicidal ideation, intent and plan, and, in view of demonstrated clinical improvement, is not judged to be an acute danger to self at this time. Although the patient remains at their chronic baseline risk of self-harm, such risk cannot be further ameliorated by continued inpatient treatment and the patient is therefore appropriate for discharge.     Aggression risk assessment was performed on the day of discharge. The patient is at elevated chronic risk of harming others due to an underlying bipolar disorder. Patient is not actively homicidal, making them appropriate for less restrictive treatment as an outpatient without need for continued inpatient hospitalization. Protective factors for aggression include residential stability, family support, sobriety, and RODRIGUEZ. Risk factors history of aggression while manic and history of nonadherence.  Immediate risk was minimized by inpatient admission to a safe environment with appropriate supervision and limited access to lethal means. Future risk was minimized before discharge by treatment of manic symptoms, maximizing outpatient support, providing relevant patient education, discussing emergency procedures, and ensuring close follow-up. Patient denies all aggressive/homicidal ideation, intent and plan, and, in view of demonstrated clinical improvement, is not judged to be an acute danger to others at this time. Although the patient remains at their chronic baseline risk of harm to others, such risk cannot be further ameliorated by continued inpatient treatment and the patient is therefore appropriate for discharge.     Plan:  2PC Legal Status  Routine Observation  Continue Abilify 10mg daily  Received Abilify Maintena 400mg IM 3/24, approved by vivo  Collateral received from mother  COVID vaccine received today s/p consent, pt aware of risks/benefits  Discharge today with 11d Abilify 10mg daily, next RODRIGUEZ due 4/23

## 2021-03-26 NOTE — BH INPATIENT PSYCHIATRY PROGRESS NOTE - NSTXDCOPLKGOAL_PSY_ALL_CORE
Will agree to consider an appropriate level of outpatient care

## 2021-03-26 NOTE — BH INPATIENT PSYCHIATRY PROGRESS NOTE - NSTXMANICGOALOTHER_PSY_ALL_CORE
Demonstrate and utilize two healthy coping skills for improved symptom management within seven days.
Demonstrate and utilize two healthy coping skills for improved symptom management within seven days.
Demonstrate and utilized two coping skills for improved symptom management within seven days.
Demonstrate and utilize two healthy coping skills for improved symptom management within seven days.

## 2021-03-26 NOTE — BH INPATIENT PSYCHIATRY PROGRESS NOTE - MSE UNSTRUCTURED FT
The patient appears stated age, appropriate hygiene.  The patient was calm, cooperative with the interview and maintained appropriate eye contact with appropriate relatedness.  No abnormal movements noted including dystonia, EPS, and TD. No restlessness reported.  Steady gait observed.  The patient's speech was regular rate, volume, and prosody.  The patient's mood is "good."  Affect is euthymic, stable and appropriate.  Thought process is linear and logical. Thought content unremarkable.  Denies any suicidal or homicidal ideation, intent, or plan. Denies hallucinations.  Cognition grossly intact, AAOx3. Insight is fair.  Judgment is fair.  Impulse control has been fair on the unit. 
The patient appears stated age, appropriate hygiene.  The patient was calm, cooperative with the interview and maintained appropriate eye contact with appropriate relatedness.  Mild psychomotor agitation noted, no abnormal movements.  Steady gait observed.  The patient's speech was regular rate, volume, and prosody.  The patient's mood is "good."  Affect is euthymic, stable and appropriate.  Thought process is linear and logical. Thought content unremarkable.  Denies any suicidal or homicidal ideation, intent, or plan. Denies hallucinations.  Cognition grossly intact, AAOx3. Insight is fair.  Judgment is fair.  Impulse control has been fair on the unit. 
The patient appears stated age, appropriate hygiene.  The patient was calm, cooperative with the interview and maintained appropriate eye contact with appropriate relatedness.  Mild psychomotor agitation noted, no abnormal movements.  Steady gait observed.  The patient's speech was hyperverbal, somewhat pressured.  The patient's mood is "fine."  Affect is euthymic, stable and appropriate.  Thought process is tangential. Thought content unremarkable.  Denies any suicidal or homicidal ideation, intent, or plan. Denies hallucinations.  Cognition grossly intact, AAOx3. Insight is poor.  Judgment is poor.  Impulse control has been fair on the unit. 
The patient appears stated age, appropriate hygiene, showered, and dressed in hospital gown.  The patient was calm, cooperative with the interview and maintained appropriate eye contact with appropriate relatedness.  No psychomotor agitation or retardation noted, no abnormal movements.  Steady gait observed.  The patient's speech was hyperverbal, not pressured, normal in tone, and volume.  The patient's mood is "fine."  Affect is euthymic, stable and appropriate.  Thought process is overinclusive, circumstantial. Thought content unremarkable.  Denies any suicidal or homicidal ideation, intent, or plan. Denies hallucinations.  Cognition grossly intact, AAOx3. Insight is poor.  Judgment is poor.  Impulse control has been fair on the unit. 
The patient appears stated age, appropriate hygiene.  The patient was calm, cooperative with the interview and maintained appropriate eye contact with appropriate relatedness.  Mild psychomotor agitation noted, no abnormal movements.  Steady gait observed.  The patient's speech was hyperverbal, somewhat pressured.  The patient's mood is "fine."  Affect is euthymic, stable and appropriate.  Thought process is tangential. Thought content unremarkable.  Denies any suicidal or homicidal ideation, intent, or plan. Denies hallucinations.  Cognition grossly intact, AAOx3. Insight is poor.  Judgment is fair.  Impulse control has been fair on the unit. 
The patient appears stated age, appropriate hygiene.  The patient was calm, cooperative with the interview and maintained appropriate eye contact with appropriate relatedness.  No psychomotor agitation or retardation noted, no abnormal movements.  Steady gait observed.  The patient's speech was hyperverbal, not pressured, normal in tone, and volume.  The patient's mood is "fine."  Affect is euthymic, stable and appropriate.  Thought process is linear. Thought content unremarkable.  Denies any suicidal or homicidal ideation, intent, or plan. Denies hallucinations.  Cognition grossly intact, AAOx3. Insight is poor.  Judgment is poor.  Impulse control has been fair on the unit. 
The patient appears stated age, appropriate hygiene.  The patient was calm, cooperative with the interview and maintained appropriate eye contact with appropriate relatedness.  Mild psychomotor agitation noted, no abnormal movements.  Steady gait observed.  The patient's speech was hyperverbal, less pressured.  The patient's mood is "fine."  Affect is euthymic, stable and appropriate.  Thought process is less tangential. Thought content unremarkable.  Denies any suicidal or homicidal ideation, intent, or plan. Denies hallucinations.  Cognition grossly intact, AAOx3. Insight is poor.  Judgment is fair.  Impulse control has been fair on the unit.

## 2021-03-26 NOTE — BH INPATIENT PSYCHIATRY PROGRESS NOTE - NSBHCONSBHPROVDETAILS_PSY_A_CORE  FT
to be contacted by primary team

## 2021-03-26 NOTE — BH INPATIENT PSYCHIATRY DISCHARGE NOTE - NSBHDCHANDOFFFT_PSY_ALL_CORE
Email sent to Beulah Villanue[facility] at [facility] with handoff, diagnostic impression, and treatment recommendations

## 2021-03-26 NOTE — BH INPATIENT PSYCHIATRY DISCHARGE NOTE - CASE SUMMARY
Patient responded to structure of the unit, medications, psychoeducational, and psychotherapy work with improvement in symptoms: manic symptoms and disorganization resolved. Patient denies depressive symptoms. There are no psychotic symptoms.  Patient feels hopeful and interested in returning home and continuing her treatment on OP basis. Her ADLs are good. She is future oriented. Patient has no suicidal or aggressive thoughts, intents, or plans. She is cognitively alert and oriented.   She denies having aggressive thoughts toward anyone.  Pt has been consistently denying any current active or passive suicidal and homicidal/aggressive i/i/p and appears to be genuine at this.   She is denying self- injurious urges. Pt is not hopeless and is not overwhelmed.   There is no motor slowing, no agitation.   Assessment of suicidal and aggression risks was: Patient has significant chronic risks (Diagnosis of  Bipolar disorder, h/o noncompliance, h/o hospitalization/discharge)  but in our assessment She is not an imminent or acute suicidal/homicidal/aggression risk at this time in view of subjective and objectively demonstrated clinical improvement, acceptance of follow up plan, and future oriented attitude.   Pt consolidated benefits of her hospitalization and is unlikely to benefit further from extended hospital stay.  Patient was provided with extensive psychoeducation and with motivational counseling to encourage abstinence and compliance. Patient was instructed on actions for crisis situations, provided with individualized crisis plan, understood and agreed to follow instructions for handling crisis, including: to come to ER or call 911 should patient feel that she is in danger of hurting self or others.  Patient also was given Suicide Prevention Lifeline number 1-312.384.4929 and provided with instructions on its use.  Patient was advised to avoid driving until it is clear that her reactions are not impaired by medications.  She responded positively to motivational counseling.  Patient was also instructed to use contraception to avoid unplanned pregnancy while on medications.  Pt’s family is very supportive.

## 2021-03-26 NOTE — BH INPATIENT PSYCHIATRY DISCHARGE NOTE - NSBHDCRISKMITIGATEFT_PSY_ALL_CORE
Suicidal risk assessment was performed on the day of discharge. The patient is at elevated chronic risk of self-harm due to an underlying mood disorder. Patient is not actively suicidal, making them appropriate for less restrictive treatment as an outpatient without need for continued inpatient hospitalization. Protective factors for suicide include lack of prior SA/SIB, lack of SI, sobriety, RODRIGUEZ, and family support. Risk factors include bipolar disorder, history of nonadherence, history of irritability, and impulsivity. Immediate risk was minimized by inpatient admission to a safe environment with appropriate supervision and limited access to lethal means. Future risk was minimized before discharge by treatment of manic symptoms, maximizing outpatient support, providing relevant patient education, discussing emergency procedures, and ensuring close follow-up. Patient denies all suicidal ideation, intent and plan, and, in view of demonstrated clinical improvement, is not judged to be an acute danger to self at this time. Although the patient remains at their chronic baseline risk of self-harm, such risk cannot be further ameliorated by continued inpatient treatment and the patient is therefore appropriate for discharge.     Aggression risk assessment was performed on the day of discharge. The patient is at elevated chronic risk of harming others due to an underlying bipolar disorder. Patient is not actively homicidal, making them appropriate for less restrictive treatment as an outpatient without need for continued inpatient hospitalization. Protective factors for aggression include residential stability, family support, sobriety, and RODRIGUEZ. Risk factors history of aggression while manic and history of nonadherence.  Immediate risk was minimized by inpatient admission to a safe environment with appropriate supervision and limited access to lethal means. Future risk was minimized before discharge by treatment of manic symptoms, maximizing outpatient support, providing relevant patient education, discussing emergency procedures, and ensuring close follow-up. Patient denies all aggressive/homicidal ideation, intent and plan, and, in view of demonstrated clinical improvement, is not judged to be an acute danger to others at this time. Although the patient remains at their chronic baseline risk of harm to others, such risk cannot be further ameliorated by continued inpatient treatment and the patient is therefore appropriate for discharge.

## 2021-03-26 NOTE — BH INPATIENT PSYCHIATRY DISCHARGE NOTE - HOSPITAL COURSE
Upon admission, patient was hyperverbal, pressured, and labile. She demonstrated significantly poor insight and minimizing the events leading to her arrest and hospitalization. She denied being on any psychiatric medications at home or seeing any providers. According to the patient's mother, she had been not sleeping, irritable, talking excessively, exercising/showering significantly above baseline, behaving erratically. Her mother explained that this episode reminded her of two prior episodes which each led to hospitalizations, and insisted that the patient has always been resistant to the possibility of having a mental illness. The patient displayed poor insight, however accepted Abilify titrated to 10mg daily, as she had been on this in the past. She was made aware of the risks and benefits of Abilify and tolerated it well with no apparent side effects. Her symptoms improved and she was better able to tolerate conversations about her symptoms, leading to improved insight. As such, she accepted Abilfiy RODRIGUEZ and seemed motivated to continue with monthly injections moving forward. She also participated consistently in groups, where she was an active participant and reported benefit and motivation to consider therapy after discharge.  On the day of discharge, the patient’s mood and affect are normal/euthymic. Patient denies depressed mood and anxiety. Patient is not hopeless. Sleep and appetite are also at baseline.  Pt’s motor performance and productivity of speech are WNL. Pt denies symptoms of psychosis including AH/VH with no apparent delusions.  Patient denies S/H/I/I/P.     There were no behavioral problems on the unit.  Patient did not become agitated, did not require emergency intramuscular medications or seclusion/restraints, and did not self-harm on the unit. Patient remained actively engaged in treatment and participated in individual, group, and milieu therapy.  Patient got along appropriately with staff and peers.  A family meeting was held prior to discharge for safety planning and disposition planning. Family is supportive and available.      Medically, during this hospitalization there were no issues.    Patient has made clinically meaningful progress during this hospitalization and has clearly benefited from medications and psychotherapy. On day of discharge, the patient has improved significantly and no longer requires inpatient treatment and care. Pt will be discharged and follow-up with outpatient care.      Patient was provided with a 14-day supply of medications, extensive psychoeducation on treatment options and motivational counseling targeting healthy lifestyle. Patient was instructed on actions for crisis situations, understood and agreed to follow instructions for handling crisis, including coming to ER or calling 911 should the patient or their family feel that they are in danger of hurting self or others. Patient also was given Suicide Prevention Lifeline number 1-318.463.1964 and provided with instructions on its use.     Patient will be discharged with the following DSM5 Diagnoses: bipolar 1 disorder, manic episode     Patient will be discharged on the following medications: Abilify 10mg daily (11 days), Abilify Maintena 400mg IM received 3/23 (next due 4/23)

## 2021-03-26 NOTE — BH INPATIENT PSYCHIATRY DISCHARGE NOTE - OTHER PAST PSYCHIATRIC HISTORY (INCLUDE DETAILS REGARDING ONSET, COURSE OF ILLNESS, INPATIENT/OUTPATIENT TREATMENT)
Pt. has a history of Bipolar 1 d/o with two prior psych. hospitalizations, one in [facility], 10/2020 and [facility] 3/9/20-3/13/20.  Currently not in treatment.

## 2021-03-26 NOTE — BH INPATIENT PSYCHIATRY DISCHARGE NOTE - NSBHMETABOLIC_PSY_ALL_CORE_FT
BMI: BMI (kg/m2): 38.6 (03-19-21 @ 18:38)  HbA1c: A1C with Estimated Average Glucose Result: 5.8 % (03-20-21 @ 11:44)    Glucose:   BP: 118/66 (03-26-21 @ 07:02) (111/61 - 118/66)  Lipid Panel: Date/Time: 03-20-21 @ 11:44  Cholesterol, Serum: 172  Direct LDL: --  HDL Cholesterol, Serum: 45  Total Cholesterol/HDL Ration Measurement: --  Triglycerides, Serum: 109

## 2021-03-26 NOTE — BH INPATIENT PSYCHIATRY PROGRESS NOTE - NSCGISEVERILLNESS_PSY_ALL_CORE
4 = Moderately ill – overt symptoms causing noticeable, but modest, functional impairment or distress; symptom level may warrant medication 2 = Borderline mentally ill – subtle or suspected pathology

## 2021-03-26 NOTE — BH INPATIENT PSYCHIATRY PROGRESS NOTE - NSICDXBHPRIMARYDX_PSY_ALL_CORE
Bipolar 1 disorder   F31.9  

## 2021-03-26 NOTE — BH INPATIENT PSYCHIATRY PROGRESS NOTE - NSTXPROBPSYCHO_PSY_ALL_CORE
PSYCHOTIC SYMPTOMS
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
## 2021-03-26 NOTE — BH PSYCHOLOGY - GROUP THERAPY NOTE - NSPSYCHOLGRPDBTPT_PSY_A_CORE FT
DBT Group is a group in which patients learn skills to better manage their emotions and behaviors. Group begins with a mindfulness practice so that patients have an opportunity to practice observing their internal and external experiences.  Following the mindfulness exercise the group learns new skills in a didactic format. Group today focused on the “distress tolerance” module.  Specifically, patients learned “self-soothe” skills, which involve using the five senses as well as movement to lower levels of distress and prevent patients from acting on crises urges. Patients were encouraged to think about examples of soothing activities they engage in, and ones that they can utilize while on the unit. Patients also practiced a variety of self soothe skills in the group to activate the senses.

## 2021-03-26 NOTE — BH INPATIENT PSYCHIATRY DISCHARGE NOTE - NSDCCPCAREPLAN_GEN_ALL_CORE_FT
PRINCIPAL DISCHARGE DIAGNOSIS  Diagnosis: Bipolar 1 disorder  Assessment and Plan of Treatment: You were admitted over concern for manic episode, with symptoms including increaed energy, irritability, decreased need for sleep, and intrusiveness, resulting in an arrest for tresspassing. You were restarted on Abilify which helped you recover and should help keep you out of the hospital. Please continue to take your pills until they run out and then continue the monthly injection. Please follow up with the bipolar clinic as scheduled.

## 2021-03-26 NOTE — BH INPATIENT PSYCHIATRY PROGRESS NOTE - CURRENT MEDICATION
MEDICATIONS  (STANDING):  ARIPiprazole 10 milliGRAM(s) Oral daily    MEDICATIONS  (PRN):  acetaminophen   Tablet .. 650 milliGRAM(s) Oral every 6 hours PRN Temp greater or equal to 38C (100.4F), Moderate Pain (4 - 6), Severe Pain (7 - 10)  diphenhydrAMINE   Injectable 50 milliGRAM(s) IntraMuscular once PRN agitation  haloperidol     Tablet 5 milliGRAM(s) Oral every 6 hours PRN agitation  haloperidol    Injectable 5 milliGRAM(s) IntraMuscular once PRN agitation  LORazepam     Tablet 2 milliGRAM(s) Oral every 6 hours PRN agitation  LORazepam   Injectable 2 milliGRAM(s) IntraMuscular once PRN agitation  
MEDICATIONS  (STANDING):  ARIPiprazole 10 milliGRAM(s) Oral daily    MEDICATIONS  (PRN):  acetaminophen   Tablet .. 650 milliGRAM(s) Oral every 6 hours PRN Temp greater or equal to 38C (100.4F), Moderate Pain (4 - 6), Severe Pain (7 - 10)  diphenhydrAMINE   Injectable 50 milliGRAM(s) IntraMuscular once PRN agitation  haloperidol     Tablet 5 milliGRAM(s) Oral every 6 hours PRN agitation  haloperidol    Injectable 5 milliGRAM(s) IntraMuscular once PRN agitation  LORazepam     Tablet 2 milliGRAM(s) Oral every 6 hours PRN agitation  LORazepam   Injectable 2 milliGRAM(s) IntraMuscular once PRN agitation  
MEDICATIONS  (STANDING):  ARIPiprazole 10 milliGRAM(s) Oral daily  ARIPiprazole Injectable, Long Acting. (ABILIFY MAINTENA) 400 milliGRAM(s) IntraMuscular once  lidocaine   Patch 1 Patch Transdermal daily    MEDICATIONS  (PRN):  acetaminophen   Tablet .. 650 milliGRAM(s) Oral every 6 hours PRN Temp greater or equal to 38C (100.4F), Moderate Pain (4 - 6), Severe Pain (7 - 10)  diphenhydrAMINE   Injectable 50 milliGRAM(s) IntraMuscular once PRN agitation  haloperidol     Tablet 5 milliGRAM(s) Oral every 6 hours PRN agitation  haloperidol    Injectable 5 milliGRAM(s) IntraMuscular once PRN agitation  LORazepam     Tablet 2 milliGRAM(s) Oral every 6 hours PRN agitation  LORazepam   Injectable 2 milliGRAM(s) IntraMuscular once PRN agitation  
MEDICATIONS  (STANDING):  ARIPiprazole 10 milliGRAM(s) Oral daily  lidocaine   Patch 1 Patch Transdermal daily    MEDICATIONS  (PRN):  acetaminophen   Tablet .. 650 milliGRAM(s) Oral every 6 hours PRN Temp greater or equal to 38C (100.4F), Moderate Pain (4 - 6), Severe Pain (7 - 10)  diphenhydrAMINE   Injectable 50 milliGRAM(s) IntraMuscular once PRN agitation  haloperidol     Tablet 5 milliGRAM(s) Oral every 6 hours PRN agitation  haloperidol    Injectable 5 milliGRAM(s) IntraMuscular once PRN agitation  LORazepam     Tablet 2 milliGRAM(s) Oral every 6 hours PRN agitation  LORazepam   Injectable 2 milliGRAM(s) IntraMuscular once PRN agitation  
MEDICATIONS  (STANDING):  ARIPiprazole 10 milliGRAM(s) Oral daily    MEDICATIONS  (PRN):  acetaminophen   Tablet .. 650 milliGRAM(s) Oral every 6 hours PRN Temp greater or equal to 38C (100.4F), Moderate Pain (4 - 6), Severe Pain (7 - 10)  diphenhydrAMINE   Injectable 50 milliGRAM(s) IntraMuscular once PRN agitation  haloperidol     Tablet 5 milliGRAM(s) Oral every 6 hours PRN agitation  haloperidol    Injectable 5 milliGRAM(s) IntraMuscular once PRN agitation  LORazepam     Tablet 2 milliGRAM(s) Oral every 6 hours PRN agitation  LORazepam   Injectable 2 milliGRAM(s) IntraMuscular once PRN agitation  
MEDICATIONS  (STANDING):  ARIPiprazole 10 milliGRAM(s) Oral daily  methocarbamol 500 milliGRAM(s) Oral two times a day    MEDICATIONS  (PRN):  acetaminophen   Tablet .. 650 milliGRAM(s) Oral every 6 hours PRN Temp greater or equal to 38C (100.4F), Moderate Pain (4 - 6), Severe Pain (7 - 10)  diphenhydrAMINE   Injectable 50 milliGRAM(s) IntraMuscular once PRN agitation  haloperidol     Tablet 5 milliGRAM(s) Oral every 6 hours PRN agitation  haloperidol    Injectable 5 milliGRAM(s) IntraMuscular once PRN agitation  LORazepam     Tablet 2 milliGRAM(s) Oral every 6 hours PRN agitation  LORazepam   Injectable 2 milliGRAM(s) IntraMuscular once PRN agitation  
MEDICATIONS  (STANDING):  ARIPiprazole 10 milliGRAM(s) Oral daily    MEDICATIONS  (PRN):  acetaminophen   Tablet .. 650 milliGRAM(s) Oral every 6 hours PRN Temp greater or equal to 38C (100.4F), Moderate Pain (4 - 6), Severe Pain (7 - 10)  diphenhydrAMINE   Injectable 50 milliGRAM(s) IntraMuscular once PRN agitation  haloperidol     Tablet 5 milliGRAM(s) Oral every 6 hours PRN agitation  haloperidol    Injectable 5 milliGRAM(s) IntraMuscular once PRN agitation  LORazepam     Tablet 2 milliGRAM(s) Oral every 6 hours PRN agitation  LORazepam   Injectable 2 milliGRAM(s) IntraMuscular once PRN agitation

## 2021-03-26 NOTE — BH INPATIENT PSYCHIATRY PROGRESS NOTE - NSBHFUPINTERVALCCFT_PSY_A_CORE
"I feel good"
Patient seen for follow up of bizarre behavior.
"I was accused of burglary"
Patient seen for follow up of bizarre behavior.

## 2021-03-26 NOTE — BH INPATIENT PSYCHIATRY PROGRESS NOTE - NSTXPSYCHOGOAL_PSY_ALL_CORE
Will identify 2 coping skills that assist with focus on reality

## 2021-03-26 NOTE — BH INPATIENT PSYCHIATRY PROGRESS NOTE - MODIFICATIONS
Patient interviewed and evaluated on morning rounds with resident present to follow up on symptoms and the case has been reviewed with resident and treatment team this morning. I have reviewed the resident's progress note and the mental status examination and I agree with its contents and treatment plan and I have made changes to the note when needed and as indicated.  Patient interviewed and evaluated on morning rounds with resident present for discharge assessment and the case has been reviewed with resident and treatment team this morning. I have reviewed the resident's progress note and the mental status examination and I agree with its contents and treatment plan and I have made changes to the note when needed and as indicated.     Patient was seen individually for discharge assessment in an extensive interview in am today. (Overall more than 30 minutes were spent working on this case: including educating and instructing patient, chart review, working on discharge assessment, coordination of care, and discharge summary.)
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
## 2021-03-26 NOTE — BH INPATIENT PSYCHIATRY PROGRESS NOTE - NSBHMETABOLIC_PSY_ALL_CORE_FT
BMI: BMI (kg/m2): 38.6 (03-19-21 @ 18:38)  HbA1c: A1C with Estimated Average Glucose Result: 5.8 % (03-20-21 @ 11:44)    Glucose:   BP: 141/60 (03-22-21 @ 08:01) (141/60 - 141/60)  Lipid Panel: Date/Time: 03-20-21 @ 11:44  Cholesterol, Serum: 172  Direct LDL: --  HDL Cholesterol, Serum: 45  Total Cholesterol/HDL Ration Measurement: --  Triglycerides, Serum: 109  
BMI: BMI (kg/m2): 38.6 (03-19-21 @ 18:38)  HbA1c: A1C with Estimated Average Glucose Result: 5.8 % (03-20-21 @ 11:44)    Glucose:   BP: --  Lipid Panel: Date/Time: 03-20-21 @ 11:44  Cholesterol, Serum: 172  Direct LDL: --  HDL Cholesterol, Serum: 45  Total Cholesterol/HDL Ration Measurement: --  Triglycerides, Serum: 109  
BMI: BMI (kg/m2): 38.6 (03-19-21 @ 18:38)  HbA1c: A1C with Estimated Average Glucose Result: 5.8 % (03-20-21 @ 11:44)    Glucose:   BP: --  Lipid Panel: Date/Time: 03-20-21 @ 11:44  Cholesterol, Serum: 172  Direct LDL: --  HDL Cholesterol, Serum: 45  Total Cholesterol/HDL Ration Measurement: --  Triglycerides, Serum: 109  
BMI: BMI (kg/m2): 38.6 (03-19-21 @ 18:38)  HbA1c: A1C with Estimated Average Glucose Result: 5.8 % (03-20-21 @ 11:44)    Glucose:   BP: 141/60 (03-22-21 @ 08:01) (141/60 - 141/60)  Lipid Panel: Date/Time: 03-20-21 @ 11:44  Cholesterol, Serum: 172  Direct LDL: --  HDL Cholesterol, Serum: 45  Total Cholesterol/HDL Ration Measurement: --  Triglycerides, Serum: 109  
BMI: BMI (kg/m2): 38.6 (03-19-21 @ 18:38)  HbA1c: A1C with Estimated Average Glucose Result: 5.8 % (03-20-21 @ 11:44)    Glucose:   BP: 111/61 (03-24-21 @ 07:22) (111/61 - 141/60)  Lipid Panel: Date/Time: 03-20-21 @ 11:44  Cholesterol, Serum: 172  Direct LDL: --  HDL Cholesterol, Serum: 45  Total Cholesterol/HDL Ration Measurement: --  Triglycerides, Serum: 109  
BMI: BMI (kg/m2): 38.6 (03-19-21 @ 18:38)  HbA1c: A1C with Estimated Average Glucose Result: 5.8 % (03-20-21 @ 11:44)    Glucose:   BP: 111/61 (03-24-21 @ 07:22) (111/61 - 111/61)  Lipid Panel: Date/Time: 03-20-21 @ 11:44  Cholesterol, Serum: 172  Direct LDL: --  HDL Cholesterol, Serum: 45  Total Cholesterol/HDL Ration Measurement: --  Triglycerides, Serum: 109  
BMI: BMI (kg/m2): 38.6 (03-19-21 @ 18:38)  HbA1c: A1C with Estimated Average Glucose Result: 5.8 % (03-20-21 @ 11:44)    Glucose:   BP: 118/66 (03-26-21 @ 07:02) (111/61 - 118/66)  Lipid Panel: Date/Time: 03-20-21 @ 11:44  Cholesterol, Serum: 172  Direct LDL: --  HDL Cholesterol, Serum: 45  Total Cholesterol/HDL Ration Measurement: --  Triglycerides, Serum: 109

## 2021-03-26 NOTE — BH INPATIENT PSYCHIATRY PROGRESS NOTE - NSBHCHARTREVIEWVS_PSY_A_CORE FT
Vital Signs Last 24 Hrs  T(C): 36.4 (22 Mar 2021 08:01), Max: 36.4 (21 Mar 2021 17:29)  T(F): 97.6 (22 Mar 2021 08:01), Max: 97.6 (21 Mar 2021 17:29)  HR: 102 (22 Mar 2021 08:01) (102 - 102)  BP: 141/60 (22 Mar 2021 08:01) (141/60 - 141/60)  BP(mean): --  RR: --  SpO2: --
Vital Signs Last 24 Hrs  T(C): 36.9 (26 Mar 2021 07:02), Max: 36.9 (25 Mar 2021 17:35)  T(F): 98.4 (26 Mar 2021 07:02), Max: 98.5 (25 Mar 2021 17:35)  HR: 78 (26 Mar 2021 07:02) (78 - 78)  BP: 118/66 (26 Mar 2021 07:02) (118/66 - 118/66)  BP(mean): --  RR: --  SpO2: --
Vital Signs Last 24 Hrs  T(C): 36.7 (20 Mar 2021 07:50), Max: 37.1 (19 Mar 2021 18:38)  T(F): 98 (20 Mar 2021 07:50), Max: 98.7 (19 Mar 2021 18:38)  HR: 3 (20 Mar 2021 07:50) (3 - 3)  BP: --  BP(mean): --  RR: 18 (19 Mar 2021 18:38) (18 - 18)  SpO2: --
Vital Signs Last 24 Hrs  T(C): 36.5 (24 Mar 2021 07:22), Max: 36.5 (24 Mar 2021 07:22)  T(F): 97.7 (24 Mar 2021 07:22), Max: 97.7 (24 Mar 2021 07:22)  HR: 87 (24 Mar 2021 07:22) (87 - 87)  BP: 111/61 (24 Mar 2021 07:22) (111/61 - 111/61)  BP(mean): --  RR: --  SpO2: --
Vital Signs Last 24 Hrs  T(C): 36.2 (23 Mar 2021 08:43), Max: 37.1 (22 Mar 2021 17:40)  T(F): 97.2 (23 Mar 2021 08:43), Max: 98.7 (22 Mar 2021 17:40)  HR: --  BP: --  BP(mean): --  RR: --  SpO2: --
Vital Signs Last 24 Hrs  T(C): 36.5 (21 Mar 2021 07:07), Max: 36.5 (21 Mar 2021 07:07)  T(F): 97.7 (21 Mar 2021 07:07), Max: 97.7 (21 Mar 2021 07:07)  HR: --  BP: --  BP(mean): --  RR: --  SpO2: --
Vital Signs Last 24 Hrs  T(C): 36.6 (25 Mar 2021 08:08), Max: 37.3 (24 Mar 2021 17:42)  T(F): 97.8 (25 Mar 2021 08:08), Max: 99.2 (24 Mar 2021 17:42)  HR: --  BP: --  BP(mean): --  RR: --  SpO2: --

## 2021-03-26 NOTE — BH INPATIENT PSYCHIATRY PROGRESS NOTE - MSE OPTIONS
Unstructured MSE
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
## 2021-03-26 NOTE — BH INPATIENT PSYCHIATRY DISCHARGE NOTE - REASON FOR ADMISSION
You were admitted for concern over manic episode in response to stress, leading to increased energy, decreased need for sleep, and being more confrontational.

## 2021-03-26 NOTE — BH PSYCHOLOGY - GROUP THERAPY NOTE - NSPSYCHOLGRPDBTPT_PSY_A_CORE
stable mood and affect in group/patient showing good behavior control/no self-destructive impulses/behaviors/other...

## 2021-03-26 NOTE — BH PSYCHOLOGY - GROUP THERAPY NOTE - NSPSYCHOLGRPDBTGOAL_PSY_A_CORE
reduce mood and affective lability/reduce impulsive self-defeating behavior/reduce interpersonal conflicts/reduce vulnerability to emotional dysregualation/promote skills to reduce anger

## 2021-03-26 NOTE — BH INPATIENT PSYCHIATRY PROGRESS NOTE - PRN MEDS
MEDICATIONS  (PRN):  acetaminophen   Tablet .. 650 milliGRAM(s) Oral every 6 hours PRN Temp greater or equal to 38C (100.4F), Moderate Pain (4 - 6), Severe Pain (7 - 10)  diphenhydrAMINE   Injectable 50 milliGRAM(s) IntraMuscular once PRN agitation  haloperidol     Tablet 5 milliGRAM(s) Oral every 6 hours PRN agitation  haloperidol    Injectable 5 milliGRAM(s) IntraMuscular once PRN agitation  LORazepam     Tablet 2 milliGRAM(s) Oral every 6 hours PRN agitation  LORazepam   Injectable 2 milliGRAM(s) IntraMuscular once PRN agitation  

## 2021-03-26 NOTE — BH INPATIENT PSYCHIATRY PROGRESS NOTE - NSDCCRITERIA_PSY_ALL_CORE
symptom reduction

## 2021-03-26 NOTE — BH INPATIENT PSYCHIATRY PROGRESS NOTE - CASE SUMMARY
This is the 3-rd episode of berna for this patient ( info from family collateral supports an impression that patient's symptoms met criteria for manic episodes.) Pt's insight is impaired and she displays still some affective instability, increased verbal productivity and pressure of speech, heightened energy, with decreased sleep. She accepted abilify Maintena yesterday and so far tolerates it well. No TD/EPS/akathisia.  She is benefitting from hospital structured environment and maintains fair control.  Tolerates Abilify well. plan to continue trial and work on improvinf insight and judgment, including efforts at family work. This is the 3-rd episode of berna for this patient ( info from family collateral supports an impression that patient's symptoms met criteria for manic episodes.) Pt's insight is impaired and she displays still some affective instability, increased verbal productivity and pressure of speech, heightened energy, with decreased sleep. She accepted abilify Maintena yesterday and so far tolerates it well. No TD/EPS/akathisia.  She has benefited from hospital structured environment and maintained fair control.  Tolerates Abilify well.     Patient responded to structure of the unit, medications, psychoeducational, and psychotherapy work with improvement in symptoms: manic symptoms and disorganization resolved. Patient denies depressive symptoms. There are no psychotic symptoms.  Patient feels hopeful and interested in returning home and continuing her treatment on OP basis. Her ADLs are good. She is future oriented. Patient has no suicidal or aggressive thoughts, intents, or plans. She is cognitively alert and oriented.   She denies having aggressive thoughts toward anyone.  Pt has been consistently denying any current active or passive suicidal and homicidal/aggressive i/i/p and appears to be genuine at this.   She is denying self- injurious urges. Pt is not hopeless and is not overwhelmed.   There is no motor slowing, no agitation.   Assessment of suicidal and aggression risks was: Patient has significant chronic risks (Diagnosis of  Bipolar disorder, h/o noncompliance, h/o hospitalization/discharge)  but in our assessment She is not an imminent or acute suicidal/homicidal/aggression risk at this time in view of subjective and objectively demonstrated clinical improvement, acceptance of follow up plan, and future oriented attitude.   Pt consolidated benefits of her hospitalization and is unlikely to benefit further from extended hospital stay.  Discharging pt today.  Patient was provided with extensive psychoeducation and with motivational counseling to encourage abstinence and compliance. Patient was instructed on actions for crisis situations, provided with individualized crisis plan, understood and agreed to follow instructions for handling crisis, including: to come to ER or call 911 should patient feel that she is in danger of hurting self or others.  Patient also was given Suicide Prevention Lifeline number 1-454.548.3469 and provided with instructions on its use.  Patient was advised to avoid driving until it is clear that her reactions are not impaired by medications.  She responded positively to motivational counseling.  Patient was also instructed to use contraception to avoid unplanned pregnancy while on medications.  Pt’s family is very supportive.

## 2021-03-26 NOTE — BH INPATIENT PSYCHIATRY PROGRESS NOTE - NSTXPROBDCOPLK_PSY_ALL_CORE
DISCHARGE ISSUE - LACK OF APPROPRIATE OUTPATIENT SERVICES

## 2021-04-23 NOTE — SOCIAL WORK POST DISCHARGE FOLLOW UP NOTE - NSBHSWFOLLOWUP_PSY_ALL_CORE_FT
SW called pt who informed writer that she is still interested [facility] and wants to reschedule her intake appointment. FREDO sent email to Centers requesting them to reschedule the intake.

## 2021-04-23 NOTE — SOCIAL WORK POST DISCHARGE FOLLOW UP NOTE - NSBHSWFOLLOWUP_PSY_ALL_CORE_FT
SW spoke with pt and informed her of new intake appointment at [facility] on 5/4 at 10:30 AM with Carlos A Hughes and Charisse.

## 2021-05-03 ENCOUNTER — OUTPATIENT (OUTPATIENT)
Dept: OUTPATIENT SERVICES | Facility: HOSPITAL | Age: 36
LOS: 1 days | Discharge: HOME | End: 2021-05-03

## 2021-06-29 PROBLEM — Z00.00 ENCOUNTER FOR PREVENTIVE HEALTH EXAMINATION: Status: ACTIVE | Noted: 2021-06-29

## 2021-08-19 NOTE — ED BEHAVIORAL HEALTH ASSESSMENT NOTE - ELEVATED CHRONIC RISK
Encounter addended by: Tejinder Paez RP on: 8/19/2021 7:46 AM   Actions taken: i-Vent created or edited Yes

## 2021-09-01 ENCOUNTER — APPOINTMENT (OUTPATIENT)
Dept: ENDOCRINOLOGY | Facility: CLINIC | Age: 36
End: 2021-09-01

## 2022-02-26 NOTE — BH DISCHARGE NOTE NURSING/SOCIAL WORK/PSYCH REHAB - NSCORESITESY/N_GEN_A_CORE_RD
Patient Information    None  Lab -- Fasting labwork has been ordered for you.    Fasting Labs Diet Restrictions  Please come prior to your next appointment to recheck fasting labs.  · Please come fasting (at least 8 hours) to your next appointment to recheck labs.  · Please drink plenty of water before your appointment.  · You can take any prescribed or routine medicine with water before your appointment.  · You can brush your teeth even if you are fasting.    -- Non-Fasting labwork has been ordered for you   Non-Fasting Labs    Please come prior to your next appointment to recheck non fasting labs.    No diet restrictions prior to lab draw.      Follow Up  -- Follow up with your regular Primary Care Provider.   -- You have been referred to RADIOLOGY FOR YOUR ULTRASOUND AND MAMMOGRAM [phone]   . Please call if you have not heard from that department in 3 days.     Additional Educational Resources:  For additional resources regarding your symptoms, diagnosis, or further health information, please visit the Health Resources section on [email or web address] or the Online Health Resources section in [facility].      
Yes

## 2022-06-14 NOTE — BH INPATIENT PSYCHIATRY ASSESSMENT NOTE - NS ED BHA REVIEW OF ED CHART AVAILABLE INVESTIGATIONS REVIEWED
Patient was offered choice of vendor and chose [facility].  Patient Meño Omalley was set up at [facility]  on June 14, 2022. Patient received a Resmed Airsense 11 Pressures were set at 5-15 cm H2O.   Patient s ramp is 5 cm H2O for Auto and FLEX/EPR is EPR, 2.  Patient received a Resmed Mask name: Airfit [id number]  Pillow mask size Standard, heated tubing and heated humidifier.  Patient does need to meet compliance. Patient has a follow up on 8/10/2022 with Adryan Pat   
Yes

## 2022-07-08 NOTE — BH INPATIENT PSYCHIATRY DISCHARGE NOTE - DESCRIPTION
If you are a smoker, it is important for your health to stop smoking. Please be aware that second hand smoke is also harmful.
Single, no children, lives with mother, supportive sister, college graduate from [employer]. 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

## 2022-09-21 NOTE — ED BEHAVIORAL HEALTH ASSESSMENT NOTE - VIOLENCE RISK FACTORS:
Please advise if agreeable to US of lump to right clavicle as recommended for follow-up from UC visit on 9/14/22. Unfortunately other than recommendation for follow-up the UC note is not completed. Has upcoming appointment on 10/21/22 as well.    Feeling of being under threat and being unable to control threat/Affective dysregulation

## 2023-07-20 NOTE — BH INPATIENT PSYCHIATRY ASSESSMENT NOTE - NS ED BHA MED ROS PSYCHIATRIC
PROCEDURAL PRE-SEDATION ASSESSMENT      Procedure date: 7/20/2023    Indications for Procedure: colon polyps    Planned Procedure: Colonoscopy    MEDICAL HISTORY   Significant medical/surgical history:  Cholecystectomy, tonsillectomy  Significant family history: NO  Smoking history: Yes  Alcohol/drug abuse: NO  Possible pregnancy (LMP): NO  Airway risk history: NO  Cardiac history: NO  Respiratory history: NO    PHYSICAL EXAM  Heart: NORMAL findings  Lungs: NORMAL findings  Mental status: NORMAL findings    OTHER FINDINGS  Reviewed current medications and allergies: YES  Reviewed pertinent lab/diagnostic tests: YES    RISK STATUS: ASA 3    AIRWAY ASSESSMENT: Mallampati Class IIl    PLAN FOR SEDATION: Monitored anesthetic care    EKG Monitoring: YES    REASSESSMENT IMMEDIATELY PRIOR TO PROCEDURE:   Risks, rationale, alternatives were discussed and consent obtained.  Patient remains a candidate for the planned sedation and procedure.    Assessing Physician: Enrique Lyons MD                         Time: 10:37 AM     See HPI

## 2024-03-21 ENCOUNTER — INPATIENT (INPATIENT)
Facility: HOSPITAL | Age: 39
LOS: 4 days | Discharge: HOME | End: 2024-03-26
Attending: INTERNAL MEDICINE | Admitting: INTERNAL MEDICINE
Payer: SELF-PAY

## 2024-03-21 VITALS
HEART RATE: 103 BPM | RESPIRATION RATE: 19 BRPM | SYSTOLIC BLOOD PRESSURE: 147 MMHG | DIASTOLIC BLOOD PRESSURE: 76 MMHG | TEMPERATURE: 98 F | OXYGEN SATURATION: 100 %

## 2024-03-21 PROCEDURE — 99285 EMERGENCY DEPT VISIT HI MDM: CPT

## 2024-03-21 NOTE — ED ADULT TRIAGE NOTE - TEMPERATURE IN FAHRENHEIT (DEGREES F)
[Patient preference] : as per patient preference [Telehealth (audio & video) - Individual/Group] : This visit was provided via telehealth using real-time 2-way audio visual technology. [Other Location: e.g. Home (Enter Location, City,State)___] : The provider was located at [unfilled]. [Home] : The patient, [unfilled], was located at home, [unfilled], at the time of the visit. [Verbal consent obtained from patient/other participant(s)] : Verbal consent for telehealth/telephonic services obtained from patient/other participant(s) [FreeTextEntry4] : 1 [FreeTextEntry5] : 11:55 [Patient] : Patient [Spouse] : spouse [TextBox_17] : Rahul Alvarez [[facility] Provider/Facility] : [facility] Provider/Facility [Other:___] : [unfilled] [FreeTextEntry2] : increased relationship stress [FreeTextEntry1] : increased stress s/p birth of daughter  98.1
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
## 2024-03-21 NOTE — ED PROVIDER NOTE - ATTENDING CONTRIBUTION TO CARE
Attending note:   After face to face evaluation of this patient, I concur with above noted hx, pe, and care plan for this patient.  Pepe: 38-year-old female presents the ED via EMS.  Patient states she is 22 years old and has no medical issues and has no idea why she is in the ED.  Patient states she has no medical issues and lives at home with mom.  As per EMS note patient was noted to be screaming [address].  As per medical record patient has a history of bipolar disorder and was last admitted to psychiatry in 2021.  Unsure if patient is taking medications as she states she is currently not on any medications.  Patient states she is not allergic to any medications.  Patient states she has never had a cigarette, alcohol or any drugs whatsoever.  In the EMR is also noted that patient has PCOS which she denied.  Patient states that her last period was the first of this month.  Patient answers questions but answers are unreliable.  Lungs are clear and heart is regular rate and rhythm.  Patient is borderline tacky EKG is normal sinus rhythm.  Abdomen soft nontender patient has no pitting edema and is moving all extremities.  Patient's speech is hoarse the patient states that has been ongoing for a long time.  Patient noted to be spitting up saliva but does not appear to have any respiratory difficulty or stridor.  Patient was able to give us mother's cell phone number and  is attempting to get collateral information.  Patient will likely require psychiatric evaluation after medical workup.

## 2024-03-21 NOTE — ED PROVIDER NOTE - PROGRESS NOTE DETAILS
Franklin Cantu, PGY3 will give blood transfusion in the ED. consent received from mother over the phone. Franklin Cantu, PGY3 will give blood transfusion in the ED. consent received from mother over the phone.  mom on the phone states daughter has not had any recent blood loss other than history of heavy periods.
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
## 2024-03-21 NOTE — ED ADULT NURSE REASSESSMENT NOTE - NS ED NURSE REASSESS COMMENT FT1
as per  RN, pt began complaining about abdominal pain, with nausea and difficulty ambulating. pt taken back to [facility] to go to [facility] to be evaluated medically first.

## 2024-03-21 NOTE — ED PROVIDER NOTE - PHYSICAL EXAMINATION
Const: Well-nourished, Well-developed, appearing stated age.  Eyes: no conjunctival injection, and symmetrical lids.  HEENT: Head NCAT, no lesions. Atraumatic external nose and ears. Moist MM.  Neck: Symmetric, trachea midline.   CVS: +S1/S2  RESP: Unlabored respiratory effort. Clear to auscultation bilaterally.  MSK: Normocephalic/Atraumatic, Lower Extremities w/o calf tenderness or edema b/l.   Skin: Warm, dry and intact.   Neuro: CNs II-XII grossly intact. Motor & Sensation grossly intact.  Psych: Awake, Alert, & Oriented (AAO) x2

## 2024-03-21 NOTE — ED PROVIDER NOTE - CLINICAL SUMMARY MEDICAL DECISION MAKING FREE TEXT BOX
38-year-old female history of bipolar disorder presenting to the ED today after she was brought in by EMS for screaming in the streets.  Patient here does not know why she is here states she was at an urgent care.  Patient thinks  That she is 22 years old, states that the date today is April 4, 1985 and that her birthday is also [birth year] for 1985.  patient having no complaints of chest pain, trouble breathing, abdominal pain or any dizziness.  Denying any psych history.  Denying any drug use, alcohol use or smoking history.  Patient denying any SI, HI or auditory hallucinations.  States she lives at home with her mother.    Will get psych clearance labs, monitor and consult psychiatry.

## 2024-03-21 NOTE — ED PROVIDER NOTE - CARE PLAN
1 Principal Discharge DX:	Altered mental status  Secondary Diagnosis:	Delirium  Secondary Diagnosis:	Anemia

## 2024-03-22 DIAGNOSIS — R65.10 SYSTEMIC INFLAMMATORY RESPONSE SYNDROME (SIRS) OF NON-INFECTIOUS ORIGIN WITHOUT ACUTE ORGAN DYSFUNCTION: ICD-10-CM

## 2024-03-22 DIAGNOSIS — F31.9 BIPOLAR DISORDER, UNSPECIFIED: ICD-10-CM

## 2024-03-22 DIAGNOSIS — R41.82 ALTERED MENTAL STATUS, UNSPECIFIED: ICD-10-CM

## 2024-03-22 DIAGNOSIS — D50.9 IRON DEFICIENCY ANEMIA, UNSPECIFIED: ICD-10-CM

## 2024-03-22 DIAGNOSIS — D25.9 LEIOMYOMA OF UTERUS, UNSPECIFIED: ICD-10-CM

## 2024-03-22 DIAGNOSIS — R63.8 OTHER SYMPTOMS AND SIGNS CONCERNING FOOD AND FLUID INTAKE: ICD-10-CM

## 2024-03-22 DIAGNOSIS — R45.1 RESTLESSNESS AND AGITATION: ICD-10-CM

## 2024-03-22 DIAGNOSIS — R74.8 ABNORMAL LEVELS OF OTHER SERUM ENZYMES: ICD-10-CM

## 2024-03-22 DIAGNOSIS — R10.9 UNSPECIFIED ABDOMINAL PAIN: ICD-10-CM

## 2024-03-22 LAB
ACANTHOCYTES BLD QL SMEAR: SLIGHT — SIGNIFICANT CHANGE UP
ADD ON TEST-SPECIMEN IN LAB: SIGNIFICANT CHANGE UP
ADD ON TEST-SPECIMEN IN LAB: SIGNIFICANT CHANGE UP
AMPHET UR-MCNC: NEGATIVE — SIGNIFICANT CHANGE UP
ANION GAP SERPL CALC-SCNC: 14 MMOL/L — SIGNIFICANT CHANGE UP (ref 7–14)
ANISOCYTOSIS BLD QL: SIGNIFICANT CHANGE UP
APAP SERPL-MCNC: <10 UG/ML — LOW (ref 15–25)
APPEARANCE UR: CLEAR — SIGNIFICANT CHANGE UP
BACTERIA # UR AUTO: NEGATIVE /HPF — SIGNIFICANT CHANGE UP
BARBITURATES UR SCN-MCNC: NEGATIVE — SIGNIFICANT CHANGE UP
BASOPHILS # BLD AUTO: 0 K/UL — SIGNIFICANT CHANGE UP (ref 0–0.2)
BASOPHILS NFR BLD AUTO: 0 % — SIGNIFICANT CHANGE UP (ref 0–2)
BENZODIAZ UR-MCNC: NEGATIVE — SIGNIFICANT CHANGE UP
BILIRUB UR-MCNC: NEGATIVE — SIGNIFICANT CHANGE UP
BLD GP AB SCN SERPL QL: NEGATIVE — SIGNIFICANT CHANGE UP
BUN SERPL-MCNC: 10 MG/DL — SIGNIFICANT CHANGE UP (ref 7–23)
BURR CELLS BLD QL SMEAR: PRESENT — SIGNIFICANT CHANGE UP
CALCIUM SERPL-MCNC: 9.3 MG/DL — SIGNIFICANT CHANGE UP (ref 8.4–10.5)
CAST: 2 /LPF — SIGNIFICANT CHANGE UP (ref 0–4)
CHLORIDE SERPL-SCNC: 109 MMOL/L — HIGH (ref 98–107)
CO2 SERPL-SCNC: 21 MMOL/L — LOW (ref 22–31)
COCAINE METAB.OTHER UR-MCNC: NEGATIVE — SIGNIFICANT CHANGE UP
COLOR SPEC: YELLOW — SIGNIFICANT CHANGE UP
CREAT SERPL-MCNC: 0.82 MG/DL — SIGNIFICANT CHANGE UP (ref 0.5–1.3)
CREATININE URINE RESULT, DAU: 155 MG/DL — SIGNIFICANT CHANGE UP
DIFF PNL FLD: NEGATIVE — SIGNIFICANT CHANGE UP
EGFR: 94 ML/MIN/1.73M2 — SIGNIFICANT CHANGE UP
EOSINOPHIL # BLD AUTO: 0.26 K/UL — SIGNIFICANT CHANGE UP (ref 0–0.5)
EOSINOPHIL NFR BLD AUTO: 1.7 % — SIGNIFICANT CHANGE UP (ref 0–6)
ETHANOL SERPL-MCNC: <10 MG/DL — SIGNIFICANT CHANGE UP
FLUAV AG NPH QL: SIGNIFICANT CHANGE UP
FLUBV AG NPH QL: SIGNIFICANT CHANGE UP
GLUCOSE SERPL-MCNC: 95 MG/DL — SIGNIFICANT CHANGE UP (ref 70–99)
GLUCOSE UR QL: NEGATIVE MG/DL — SIGNIFICANT CHANGE UP
HCG SERPL-ACNC: <1 MIU/ML — SIGNIFICANT CHANGE UP
HCT VFR BLD CALC: 25.3 % — LOW (ref 34.5–45)
HCT VFR BLD CALC: 25.8 % — LOW (ref 34.5–45)
HGB BLD-MCNC: 7.3 G/DL — LOW (ref 11.5–15.5)
HGB BLD-MCNC: 7.5 G/DL — LOW (ref 11.5–15.5)
HYPOCHROMIA BLD QL: SIGNIFICANT CHANGE UP
IANC: 11.61 K/UL — HIGH (ref 1.8–7.4)
KETONES UR-MCNC: 80 MG/DL
LEUKOCYTE ESTERASE UR-ACNC: ABNORMAL
LYMPHOCYTES # BLD AUTO: 14.9 % — SIGNIFICANT CHANGE UP (ref 13–44)
LYMPHOCYTES # BLD AUTO: 2.24 K/UL — SIGNIFICANT CHANGE UP (ref 1–3.3)
MCHC RBC-ENTMCNC: 17.4 PG — LOW (ref 27–34)
MCHC RBC-ENTMCNC: 17.6 PG — LOW (ref 27–34)
MCHC RBC-ENTMCNC: 28.9 GM/DL — LOW (ref 32–36)
MCHC RBC-ENTMCNC: 29.1 GM/DL — LOW (ref 32–36)
MCV RBC AUTO: 59.9 FL — LOW (ref 80–100)
MCV RBC AUTO: 61.1 FL — LOW (ref 80–100)
METHADONE UR-MCNC: NEGATIVE — SIGNIFICANT CHANGE UP
MICROCYTES BLD QL: SIGNIFICANT CHANGE UP
MONOCYTES # BLD AUTO: 0.8 K/UL — SIGNIFICANT CHANGE UP (ref 0–0.9)
MONOCYTES NFR BLD AUTO: 5.3 % — SIGNIFICANT CHANGE UP (ref 2–14)
NEUTROPHILS # BLD AUTO: 11.72 K/UL — HIGH (ref 1.8–7.4)
NEUTROPHILS NFR BLD AUTO: 78.1 % — HIGH (ref 43–77)
NITRITE UR-MCNC: NEGATIVE — SIGNIFICANT CHANGE UP
NRBC # BLD: 0 /100 WBCS — SIGNIFICANT CHANGE UP (ref 0–0)
NRBC # FLD: 0 K/UL — SIGNIFICANT CHANGE UP (ref 0–0)
OPIATES UR-MCNC: NEGATIVE — SIGNIFICANT CHANGE UP
OVALOCYTES BLD QL SMEAR: SLIGHT — SIGNIFICANT CHANGE UP
OXYCODONE UR-MCNC: NEGATIVE — SIGNIFICANT CHANGE UP
PCP SPEC-MCNC: SIGNIFICANT CHANGE UP
PCP UR-MCNC: NEGATIVE — SIGNIFICANT CHANGE UP
PH UR: 5.5 — SIGNIFICANT CHANGE UP (ref 5–8)
PLAT MORPH BLD: NORMAL — SIGNIFICANT CHANGE UP
PLATELET # BLD AUTO: 418 K/UL — HIGH (ref 150–400)
PLATELET # BLD AUTO: 522 K/UL — HIGH (ref 150–400)
PLATELET COUNT - ESTIMATE: ABNORMAL
POIKILOCYTOSIS BLD QL AUTO: SIGNIFICANT CHANGE UP
POLYCHROMASIA BLD QL SMEAR: SLIGHT — SIGNIFICANT CHANGE UP
POTASSIUM SERPL-MCNC: 3.2 MMOL/L — LOW (ref 3.5–5.3)
POTASSIUM SERPL-SCNC: 3.2 MMOL/L — LOW (ref 3.5–5.3)
PROT UR-MCNC: 30 MG/DL
RBC # BLD: 4.14 M/UL — SIGNIFICANT CHANGE UP (ref 3.8–5.2)
RBC # BLD: 4.31 M/UL — SIGNIFICANT CHANGE UP (ref 3.8–5.2)
RBC # FLD: 22.5 % — HIGH (ref 10.3–14.5)
RBC # FLD: 23.4 % — HIGH (ref 10.3–14.5)
RBC BLD AUTO: ABNORMAL
RBC CASTS # UR COMP ASSIST: 0 /HPF — SIGNIFICANT CHANGE UP (ref 0–4)
RH IG SCN BLD-IMP: POSITIVE — SIGNIFICANT CHANGE UP
RH IG SCN BLD-IMP: POSITIVE — SIGNIFICANT CHANGE UP
RSV RNA NPH QL NAA+NON-PROBE: SIGNIFICANT CHANGE UP
SALICYLATES SERPL-MCNC: <0.3 MG/DL — LOW (ref 15–30)
SARS-COV-2 RNA SPEC QL NAA+PROBE: SIGNIFICANT CHANGE UP
SCHISTOCYTES BLD QL AUTO: SLIGHT — SIGNIFICANT CHANGE UP
SMUDGE CELLS # BLD: PRESENT — SIGNIFICANT CHANGE UP
SODIUM SERPL-SCNC: 144 MMOL/L — SIGNIFICANT CHANGE UP (ref 135–145)
SP GR SPEC: 1.04 — HIGH (ref 1–1.03)
SQUAMOUS # UR AUTO: 2 /HPF — SIGNIFICANT CHANGE UP (ref 0–5)
THC UR QL: NEGATIVE — SIGNIFICANT CHANGE UP
TOXICOLOGY SCREEN, DRUGS OF ABUSE, SERUM RESULT: SIGNIFICANT CHANGE UP
UROBILINOGEN FLD QL: 0.2 MG/DL — SIGNIFICANT CHANGE UP (ref 0.2–1)
WBC # BLD: 15 K/UL — HIGH (ref 3.8–10.5)
WBC # BLD: 8.98 K/UL — SIGNIFICANT CHANGE UP (ref 3.8–10.5)
WBC # FLD AUTO: 15 K/UL — HIGH (ref 3.8–10.5)
WBC # FLD AUTO: 8.98 K/UL — SIGNIFICANT CHANGE UP (ref 3.8–10.5)
WBC UR QL: 4 /HPF — SIGNIFICANT CHANGE UP (ref 0–5)

## 2024-03-22 PROCEDURE — 70450 CT HEAD/BRAIN W/O DYE: CPT | Mod: 26,MC

## 2024-03-22 PROCEDURE — 99223 1ST HOSP IP/OBS HIGH 75: CPT

## 2024-03-22 PROCEDURE — 99285 EMERGENCY DEPT VISIT HI MDM: CPT | Mod: GC

## 2024-03-22 PROCEDURE — 74177 CT ABD & PELVIS W/CONTRAST: CPT | Mod: 26,MC

## 2024-03-22 PROCEDURE — 99232 SBSQ HOSP IP/OBS MODERATE 35: CPT

## 2024-03-22 PROCEDURE — 71046 X-RAY EXAM CHEST 2 VIEWS: CPT | Mod: 26

## 2024-03-22 RX ORDER — DIPHENHYDRAMINE HCL 50 MG
50 CAPSULE ORAL EVERY 6 HOURS
Refills: 0 | Status: DISCONTINUED | OUTPATIENT
Start: 2024-03-22 | End: 2024-03-26

## 2024-03-22 RX ORDER — LANOLIN ALCOHOL/MO/W.PET/CERES
3 CREAM (GRAM) TOPICAL AT BEDTIME
Refills: 0 | Status: DISCONTINUED | OUTPATIENT
Start: 2024-03-22 | End: 2024-03-26

## 2024-03-22 RX ORDER — ACETAMINOPHEN 500 MG
650 TABLET ORAL EVERY 6 HOURS
Refills: 0 | Status: DISCONTINUED | OUTPATIENT
Start: 2024-03-22 | End: 2024-03-26

## 2024-03-22 RX ORDER — POTASSIUM CHLORIDE 20 MEQ
40 PACKET (EA) ORAL ONCE
Refills: 0 | Status: COMPLETED | OUTPATIENT
Start: 2024-03-22 | End: 2024-03-22

## 2024-03-22 RX ORDER — HALOPERIDOL DECANOATE 100 MG/ML
5 INJECTION INTRAMUSCULAR EVERY 6 HOURS
Refills: 0 | Status: DISCONTINUED | OUTPATIENT
Start: 2024-03-22 | End: 2024-03-26

## 2024-03-22 RX ORDER — RISPERIDONE 4 MG/1
2 TABLET ORAL AT BEDTIME
Refills: 0 | Status: DISCONTINUED | OUTPATIENT
Start: 2024-03-22 | End: 2024-03-23

## 2024-03-22 RX ORDER — BENZOCAINE AND MENTHOL 5; 1 G/100ML; G/100ML
1 LIQUID ORAL EVERY 8 HOURS
Refills: 0 | Status: COMPLETED | OUTPATIENT
Start: 2024-03-22 | End: 2024-03-24

## 2024-03-22 RX ORDER — POTASSIUM CHLORIDE 20 MEQ
20 PACKET (EA) ORAL ONCE
Refills: 0 | Status: COMPLETED | OUTPATIENT
Start: 2024-03-22 | End: 2024-03-22

## 2024-03-22 RX ORDER — SODIUM CHLORIDE 9 MG/ML
1000 INJECTION INTRAMUSCULAR; INTRAVENOUS; SUBCUTANEOUS ONCE
Refills: 0 | Status: COMPLETED | OUTPATIENT
Start: 2024-03-22 | End: 2024-03-22

## 2024-03-22 RX ORDER — ONDANSETRON 8 MG/1
4 TABLET, FILM COATED ORAL EVERY 8 HOURS
Refills: 0 | Status: DISCONTINUED | OUTPATIENT
Start: 2024-03-22 | End: 2024-03-26

## 2024-03-22 RX ORDER — HALOPERIDOL DECANOATE 100 MG/ML
5 INJECTION INTRAMUSCULAR EVERY 6 HOURS
Refills: 0 | Status: DISCONTINUED | OUTPATIENT
Start: 2024-03-22 | End: 2024-03-22

## 2024-03-22 RX ADMIN — Medication 3 MILLIGRAM(S): at 21:03

## 2024-03-22 RX ADMIN — SODIUM CHLORIDE 1000 MILLILITER(S): 9 INJECTION INTRAMUSCULAR; INTRAVENOUS; SUBCUTANEOUS at 04:58

## 2024-03-22 RX ADMIN — Medication 20 MILLIEQUIVALENT(S): at 15:51

## 2024-03-22 RX ADMIN — BENZOCAINE AND MENTHOL 1 LOZENGE: 5; 1 LIQUID ORAL at 21:06

## 2024-03-22 RX ADMIN — RISPERIDONE 2 MILLIGRAM(S): 4 TABLET ORAL at 21:03

## 2024-03-22 RX ADMIN — BENZOCAINE AND MENTHOL 1 LOZENGE: 5; 1 LIQUID ORAL at 15:50

## 2024-03-22 NOTE — ED ADULT NURSE NOTE - CHIEF COMPLAINT QUOTE
pt brought in by EMS, found in the street screaming. pt does not remember how and when she was picked up. pt denies SI/HI, denies hallucinations. pt presenting with psychotic behavior. NP Malinda Julien informed. pt denies any medical or psych history

## 2024-03-22 NOTE — PATIENT PROFILE ADULT - FALL HARM RISK - HARM RISK INTERVENTIONS

## 2024-03-22 NOTE — H&P ADULT - TIME BILLING
review of laboratory data, radiology results, consultants' recommendations, documentation in [place], discussion with patient/ACP and interdisciplinary staff (such as , social workers, etc). Interventions were performed as documented above.

## 2024-03-22 NOTE — ED ADULT NURSE REASSESSMENT NOTE - NS ED NURSE REASSESS COMMENT FT1
Break coverage: Pt received in stretcher in room trauma C. Pt resting comfortably. pt offered no complains at present. respiration even and non-labored. in NAD. Pending psych consult

## 2024-03-22 NOTE — H&P ADULT - NSHPLABSRESULTS_GEN_ALL_CORE
LABS:                        7.5    15.00 )-----------( 522      ( 22 Mar 2024 00:27 )             25.8     03-22    144  |  109<H>  |  10  ----------------------------<  95  3.2<L>   |  21<L>  |  0.82    Ca    9.3      22 Mar 2024 00:27        Urinalysis Basic - ( 22 Mar 2024 06:14 )    Color: Yellow / Appearance: Clear / S.043 / pH: x  Gluc: x / Ketone: 80 mg/dL  / Bili: Negative / Urobili: 0.2 mg/dL   Blood: x / Protein: 30 mg/dL / Nitrite: Negative   Leuk Esterase: Trace / RBC: 0 /HPF / WBC 4 /HPF   Sq Epi: x / Non Sq Epi: 2 /HPF / Bacteria: Negative /HPF      CAPILLARY BLOOD GLUCOSE          RADIOLOGY & ADDITIONAL TESTS: Reviewed.
51

## 2024-03-22 NOTE — H&P ADULT - PROBLEM SELECTOR PLAN 2
- pt p/w abd pain and nausea, witnessed to be "spitting up sputum" per ER notes   - on arrival, meeting SIRS criteria as below  - afebrile and non-toxic appearing   - abd exam benign  - UA neg  - CT a/p with enlarged fibroid uterus with 7.5cm central fibroid  - s/p 1L NS in ER  - continue to monitor  - tylenol PRN pain  - maalox PRN   - defer abx

## 2024-03-22 NOTE — H&P ADULT - PROBLEM SELECTOR PLAN 8
- F: s/p 1L NS  - E: replete K<4, Mg<2  - N:   - D: low improve  - G:     code: full  dispo: pending medical optimization and psych eval; can not leave AMA - F: s/p 1L NS  - E: replete K<4, Mg<2  - N: regular diet  - D: low improve  - G: none    code: full  dispo: pending medical optimization and psych eval; can not leave AMA at present

## 2024-03-22 NOTE — ED ADULT NURSE REASSESSMENT NOTE - NS ED NURSE REASSESS COMMENT FT1
Report received from BEATRICE Long. Pt is A&Ox1, receiving 1 unit PRBCs. no signs of transfusion reaction noted. on constant observation for delusions, staff at bedside. breathing is even and nonlabored. Denies pain or SOB. IV patent. admitted to medicine, awaiting bed assignment. Stretcher set in lowest position, safety maintained.

## 2024-03-22 NOTE — ED BEHAVIORAL HEALTH ASSESSMENT NOTE - NSBHATTESTCOMMENTATTENDFT_PSY_A_CORE
Patient is a 37yo female, single, domiciled with mother, graduated from [employer], PPH of Bipolar Disorder I, 3 prior psych hospitalizations ([facility] 3/19/21-3/26/21, [facility] 3/9-3/13/20, [facility] 10/2020), not currently in treatment, no h/o SA/NSSIB/SI, no h/o substance use, no known hx of violence, hx of arrest for trespassing in 2021 leading to [facility] admission, PMH PCOS and anemia, now BIBEMS after being found in street screaming.    On exam pt is found sweating , heaving and appeared in distress from pain , stating she has abdominal pain. Patient is not oriented to time thinks its 1985 and April, she also think the president of [place] is Inocencio Davidson . Pt is not sure why she is in the hospital but is focused on medical  complaints. Patient denies psychiatric sx , including manic , psychotic sx and while she is somewhat bizarre in ed it is unclear if the sx are due to underlying medical issue or reemergence of psychiatric sx. Given abnormal labs, current state of confusion pt will need further medical work up .

## 2024-03-22 NOTE — H&P ADULT - PROBLEM SELECTOR PLAN 6
- pt with hx PCOS and fibroids   - CT a/p with enlarged fibroid uterus with 7.5cm central fibroid  - suspect menorrhagia, likely contributing to above anemia  - outpt f/u

## 2024-03-22 NOTE — ED BEHAVIORAL HEALTH ASSESSMENT NOTE - HPI (INCLUDE ILLNESS QUALITY, SEVERITY, DURATION, TIMING, CONTEXT, MODIFYING FACTORS, ASSOCIATED SIGNS AND SYMPTOMS)
Patient is a 39yo female, single, domiciled with mother, graduated from [employer], PPH of Bipolar Disorder I, 3 prior psych hospitalizations ( [facility] 3/19/21-3/26/21, [facility] 3/9-3/13/20, [place] 10/2020), not currently in treatment, no h/o SA/NSSIB/SI, no h/o substance use, no known hx of violence, hx of arrest for trespassing in 2021 leading to [facility] admission, PMH PCOS and anemia, now BIBEMS after being found in street screaming.    On interview, patient was illogical and disorganized. She reports she does not know why she is in the ED and does not remember the events leading to ED stay including being found by EMS. 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 Patient was able to state name and location ("[facility]") but not date, instead repeating date of birth. Patient at times provided contradictory information (for example, reports she sleeps "a lot," then states she sleeps 1h each night). Patient denies AH/VH, paranoia/delusions, and referential thinking. She is perseverative on her boss not letting her go home for lunch and that she needs to take care of her mother in a wheelchair. Patient denies depressed mood or anxiety. She states she is "happy all the time" but denies increased energy/motivation. Denies SI/HI and denies legal hx.    Patient reports she was previously hospitalized at [facility] for "sleep" and denies any prior psychotropic med trials including Abilify, which per chart was started during last admission. She states that she takes no medications and has never used illicit/recreational substances including alcohol, cannabis, and tobacco (reports she needs to "flush the toxins out"). Denies any medical problems and states her voice is chronically hoarse due to allergies. Denies family hx of mental illness.    Patient describes feeling nauseous and chewing on ice daily. Also endorses ongoing abdominal pain. Patient is a 37yo female, single, domiciled with mother, graduated from [employer], PPH of Bipolar Disorder I, 3 prior psych hospitalizations ( [facility] 3/19/21-3/26/21, [facility] 3/9-3/13/20, [place] 10/2020), not currently in treatment, no h/o SA/NSSIB/SI, no h/o substance use, no known hx of violence, hx of arrest for trespassing in 2021 leading to [facility] admission, PMH PCOS and anemia, now BIBEMS after being found in street screaming.    On interview, patient  appeared confused and  and at times did not make sense, she was noted to have dry heaving  and complaining of abdominal pain . She reports she does not know why she is in the ED and does not remember the events leading to ED stay including being found by EMS. 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 Patient was able to state name and location ("[facility]") but not date, instead repeating date of birth. Patient at times provided contradictory information (for example, reports she sleeps "a lot," then states she sleeps 1h each night). Patient denies AH/VH, paranoia/delusions, and referential thinking. She is perseverative on her boss not letting her go home for lunch and that she needs to take care of her mother in a wheelchair. Patient denies depressed mood or anxiety. She states she is "happy all the time" but denies increased energy/motivation. Denies SI/HI and denies legal hx.    Patient reports she was previously hospitalized at [facility] for "sleep" and denies any prior psychotropic med trials including Abilify, which per chart was started during last admission. She states that she takes no medications and has never used illicit/recreational substances including alcohol, cannabis, and tobacco (reports she needs to "flush the toxins out"). Denies any medical problems and states her voice is chronically hoarse due to allergies. Denies family hx of mental illness.    Patient describes feeling nauseous and chewing on ice daily. Also endorses ongoing abdominal pain.
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
## 2024-03-22 NOTE — ED BEHAVIORAL HEALTH ASSESSMENT NOTE - RISK ASSESSMENT
Chronic baseline risk factors: single, history of psychiatric illness, history of psychiatric hospitalizations, history of treatment noncompliance, history of arrest, poor insight/judgment  Acute risk factors: acute decompensation of psychiatric illness, not in active treatment.  Mitigating factors: social support, no history of SA, no current SI/HI/AVH/paranoia, stable housing, employment, no access to firearms    This patient is not at an acutely elevated risk of harm to self or others.

## 2024-03-22 NOTE — ED BEHAVIORAL HEALTH ASSESSMENT NOTE - NSBHMSEMOOD_PSY_A_CORE
Occupational Therapy    Visit Type: initial evaluation, treatment and discharge  SUBJECTIVE  Patient agreed to participate in therapy this date.  Patient verbally agrees to allow the following to be present during session: student  \"I'm ready to go home and get up\"  Patient has not been hospitalized, in a skilled nursing facility, or seen by home health in the last 30 days.  Patient / Family Goal: return home    Pain   Patient denies pain., Patient reports pain is not an issue/concern.    At onset of session (out of 10): 0    OBJECTIVE     Cognitive Status   Orientation    - Oriented to: person, place, time and situation  Functional Communication   - Overall Status: within functional limits    Patient Activity Tolerance: 2 to 1 activity to rest         Bed Mobility  - Supine to sit: modified independent  - Sit to supine: modified independent  Transfers  Assistive devices: gait belt, single point cane  - Sit to stand: modified independent  - Stand to sit: modified independent  - Stand pivot: modified independent      Functional Ambulation  - Assistance: modified independent  - Assistive device: gait belt and single point cane  - Distance (ft):10; 10  - Surface: even  Activities of Daily Living (ADLs)  Grooming/Oral Hygiene:   - Grooming assist: modified independent  - Position: standing at sink  Lower Body Dressing:   - Assist: modified independent  - Position: sitting on toilet and standing  - Footwear:       - Assistance: modified independent       - Position: chair       - Type: socks  Toileting:   - Toilet transfer:        - Assist: modified independent       - Device: gait belt and single point cane       - Equipment: grab bar use  - Assist: modified independent  - Position: sitting and standing  Pt educated on compensatory techniques and agrees to implement at home. Pt reports no concerns with ADLs at home.   Interventions    Training provided: activity tolerance, ADL training, body mechanics, compensatory  techniques, gait training, transfer training and functional ambulation  Skilled input: verbal instruction/cues  Verbal Consent: Writer verbally educated and received verbal consent for hand placement, positioning of patient, and techniques to be performed today from patient for clothing adjustments for techniques, hand placement and palpation for techniques and therapist position for techniques as described above and how they are pertinent to the patient's plan of care.         Education:   - Present and ready to learn: patient  Education provided during session:  - Results of above outlined education: Verbalizes understanding and Demonstrates understanding    ASSESSMENT   Interferring components: medical status limitations    Discharge needs based on today's assessment:  - Current level of function: at baseline level of function  - Therapy needs at discharge: does not require ongoing therapy  - Activities of daily living (ADLs) requiring support at discharge: bathing  - Instrumental activities of daily living (IADLs) requiring support at discharge: home management  AM-PAC  - Prior Level of Function: IND/MOD I ([facility] 22-24)       Key: MOD A=moderate assistance, IND/MOD I=independent/modified independent  - Generalized Current Level of Function     - Current Self-Cares: 23       Scoring Key= >21 Modified Independent; 20-21 Supervision; 18-19 Minimal assist; 13-17 Moderate assist; 9-12 Max assist; <9 Total assist        Personal Occupations Profile Affected: bathing/showering, home establishment/managements      Clinical decision making: Low - Patient has few limitations (1-3), comorbidities and/or complexities, as noted in problem focused assessment noted above, that impact their occupational profile.  Resulting in few treatment options and no task modification consistent with low clinical decision making complexity.    PLAN (while hospitalized)  Suggestions for next session as indicated: OT Frequency: DC OT       PT/OT Mobility Equipment for Discharge: Cane  PT/OT ADL Equipment for Discharge: DME in place  Interventions: ADL retraining, functional transfer training, activity tolerance training and body mechanics  Agreement to plan and goals: patient agrees with goals and treatment plan      GOALS  Review Date: 2/21/2024  Long Term Goals: (to be met by time of discharge from hospital)  Grooming: Patient will complete grooming tasks modified independent.  Status: met   Lower body dressing: Patient will complete lower body dressing modified independent.  Status: met   Toileting: Patient will complete toileting modified independent.  Status: met   Toilet transfer: Patient will complete toilet transfer with single point cane, modified independent.   Status: met   Home setting transfer: Patient will complete home setting transfers with single point cane, modified independent.   Status: met     Documented in the chart in the following areas: Assessment/Plan.    Admitting diagnosis: Gastrointestinal hemorrhage, unspecified gastrointestinal hemorrhage type [K92.2]     Co-morbidities and problem list:   Patient Active Problem List:     Type 2 diabetes mellitus without complication, without long-term current use of insulin (CMD)     Hypertension associated with diabetes (CMD)     Pure hypercholesterolemia     Bilateral carotid artery stenosis     Primary osteoarthritis of both knees     Gastrointestinal hemorrhage, unspecified gastrointestinal hemorrhage type        The referring provider's electronic signature on the evaluation authorizes the therapy plan of care and certifies the need for these services, furnished under this plan of care while under their care.    Patient at End of Session:   Location: in chair  Safety measures: call light within reach  Handoff to: therapist and nurse      I was in the immediate presence of the student and directed the student’s performance of the services. I am responsible for all treatment,  assessment, documentation, and billing rendered for this patient.   Angelina Dawit        Therapy procedure time and total treatment time can be found documented on the Time Entry flowsheet   "happy"/Normal

## 2024-03-22 NOTE — PHYSICAL THERAPY INITIAL EVALUATION ADULT - NSPTDISCHREC_GEN_A_CORE
rehab facility to address current functional limitation to optimize safety to allow pt. to reach their optimal level of function./Sub-acute Rehab

## 2024-03-22 NOTE — H&P ADULT - NSHPREVIEWOFSYSTEMS_GEN_ALL_CORE
CONSTITUTIONAL: No weakness, fevers or chills.   EYES/ENT: No visual changes;  No vertigo or throat pain   NECK: No pain or stiffness  RESPIRATORY: No cough, wheezing, hemoptysis; No shortness of breath  CARDIOVASCULAR: No chest pain or palpitations  GASTROINTESTINAL: +abd pain and nausea, no vomiting, or hematemesis; No diarrhea or constipation. No melena or hematochezia.  GENITOURINARY: No dysuria, frequency or hematuria  NEUROLOGICAL: No numbness or weakness  SKIN: No itching, burning, rashes, or lesions   All other review of systems is negative unless indicated above.

## 2024-03-22 NOTE — H&P ADULT - HISTORY OF PRESENT ILLNESS
Pt is a 37 yo F with PMH BP d/o, PCOS, and anemia p/w agitation, abd pain, and nausea. Majority of history obtained from chart review, as pt poor historian. Pt found wandering on the street, brought in for safety. Per family, pt off all meds x extended period and with recent hyperactivity. Per chart review, last hospitalization 3/2021 with tx to inpt [facility] where she was treated with abilify. 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     On arrival, T 98.1, , /76, RR 19 O2sat 100% RA. EKG NSR without ischemic changes. Labs with WBC 15, 78% neutrophils, Hb 7.5, MCV 59.9, K 3.2, HCG neg, TSH WNL, , UA neg, UDS neg, ETOH/salicylate/acetaminophen neg, and RVP neg. CXR neg, CTH neg, and CT a/p with enlarged fibroid uterus with 7.5cm central fibroid. Pt given 1L NS and 1U RBCs. Psych consulted with recs pending.  Pt is a 37 yo F with PMH BP d/o, PCOS, and anemia p/w agitation, abd pain, and nausea. Majority of history obtained from chart review, as pt poor historian. Pt found wandering on the street, brought in for safety. Per family, pt off all meds x extended period and with recent hyperactivity. Per chart review, last hospitalization 3/2021 with tx to inpt [facility] where she was treated with abilify. 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 On exam, pt apathetic and does not recall reason for hospitalization or how she arrived to the hospital. Denies working, says she "stays at home and does what needs to be done". Denies being on any medications or any psychiatric history. Says she does not know why she is here and that she needs to go home and needs a "script for PT for her R sided weakness that has been going on over a year and has not been evaluated in the past". Also says she "always has a hoarse voice and just takes cough drops for it". No other concerns or complaints.    On arrival, T 98.1, , /76, RR 19 O2sat 100% RA. EKG NSR without ischemic changes. Labs with WBC 15, 78% neutrophils, Hb 7.5, MCV 59.9, K 3.2, HCG neg, TSH WNL, , UA neg, UDS neg, ETOH/salicylate/acetaminophen neg, and RVP neg. CXR neg, CTH neg, and CT a/p with enlarged fibroid uterus with 7.5cm central fibroid. Pt given 1L NS and 1U RBCs. Psych consulted with recs pending.

## 2024-03-22 NOTE — ED ADULT NURSE REASSESSMENT NOTE - NS ED NURSE REASSESS COMMENT FT1
Pt started on blood transfusion. Pt has no complaints at this time. Pt denies chest pain, SOB, dizziness, headache, blurry vision, chills. Pt aware to notify staff of any s/s of transfusion reaction. Bed in lowest position, call bell within reach.

## 2024-03-22 NOTE — H&P ADULT - NSHPPHYSICALEXAM_GEN_ALL_CORE
VITAL SIGNS:  T(C): 36.9 (03-22-24 @ 11:04), Max: 36.9 (03-22-24 @ 11:04)  T(F): 98.5 (03-22-24 @ 11:04), Max: 98.5 (03-22-24 @ 11:04)  HR: 81 (03-22-24 @ 11:04) (71 - 103)  BP: 146/88 (03-22-24 @ 11:04) (123/58 - 152/85)  BP(mean): --  RR: 88 (03-22-24 @ 11:04) (14 - 88)  SpO2: 100% (03-22-24 @ 11:04) (100% - 100%)  Wt(kg): --    PHYSICAL EXAM:  Constitutional: resting comfortably in bed; NAD; +hirsutism; +hoarse voice   Head: NC/AT  Eyes: PERRL, EOMI, anicteric sclera  ENT: no nasal discharge; MMM  Neck: supple; no JVD  Respiratory: CTA B/L; no W/R/R; comfortable on RA  Cardiac: +S1/S2; RRR; no M/R/G  Gastrointestinal: soft, overly nourished; NT/ND; no rebound or guarding; +BSx4  Extremities: WWP, no clubbing or cyanosis; no peripheral edema  Musculoskeletal: moves all extremities spontaneously  Vascular: 2+ radial, DP/PT pulses B/L  Dermatologic: skin warm, dry and intact; no rashes, wounds, or scars  Neurologic: AAOx2 ([facility] [place], [facility], [facility] CNII-XII grossly intact; no focal deficits; mm strength 5/5 BL UE and BL LE  Psychiatric: apathetic, flat affect

## 2024-03-22 NOTE — H&P ADULT - PROBLEM SELECTOR PLAN 3
- on arrival, pt tachycardic with leukocytosis  - no localizing infectious complaints; abd pain as above  - no obvious infectious source   - UA neg, RVP neg  - CXR neg  - CT a/p with fibroid uterus as below   - ?reactive leukocytosis   - continue to monitor  - defer abx at present

## 2024-03-22 NOTE — BH CONSULTATION LIAISON PROGRESS NOTE - NSBHASSESSMENTFT_PSY_ALL_CORE
37yo female, single, domiciled with mother, graduated from [employer], PPH of Bipolar Disorder I, 3 prior psych hospitalizations ([facility] 3/19/21-3/26/21, [facility] 3/9-3/13/20, [facility] 10/2020), not currently in treatment, no h/o SA/NSSIB/SI, no h/o substance use, no known hx of violence, hx of arrest for trespassing in 2021 leading to [facility] admission, PMH PCOS and anemia, now BIBEMS after being found in street screaming.    On interview, patient was disorganized, and appears confused, not fully oriented (could not state date and could not recall events leading to ED visit). She did not have overt signs of berna and no positive sx of psychosis were elicited. Of note, patient has chronic hx of treatment nonadherence and poor insight into psychiatric hx. Given patient's somatic complaints and abnormal labs including leukocytosis and anemia, as well as confusion, recommend medical admission for further workup. For non-redirectable agitation, can consider PRN Haldol 5mg/Ativan 2mg/Benadryl 50mg PO/IM if QTc<500.    Plan:  - patient will likely require psychiatric admission when medically cleared  - start risperdal 2mg qhs, uptitrate as tolerated with goal of transitioning to invega RODRIGUEZ given history of noncompliance  - ativan 1mg q6h PRN for insomnia or anxiety  - PRN: Haldol 5mg PO/IM q6h PRN agitation, Benadryl 50mg PO/IM q6h PRN EPS ppx, Ativan 2mg PO/IM q6h PRN agitation           -  [monitor EKG for QTc<500, keep K>4, Mg>2]  - recommend admission to 7S  - maintain CO 1:1 for elopement risk  - Patient cannot leave AMA     37yo female, single, domiciled with mother, graduated from [employer], PPH of Bipolar Disorder I, 3 prior psych hospitalizations ([facility] 3/19/21-3/26/21, [facility] 3/9-3/13/20, [facility] 10/2020), not currently in treatment, no h/o SA/NSSIB/SI, no h/o substance use, no known hx of violence, hx of arrest for trespassing in 2021 leading to [facility] admission, PMH PCOS and anemia, now BIBEMS after being found in street screaming.    On interview, patient was disorganized, and appears confused, not fully oriented (could not state date and could not recall events leading to ED visit). She did not have overt signs of berna and no positive sx of psychosis were elicited. Of note, patient has chronic hx of treatment nonadherence and poor insight into psychiatric hx. Given patient's somatic complaints and abnormal labs including leukocytosis and anemia, as well as confusion, recommend medical admission for further workup. For non-redirectable agitation, can consider PRN Haldol 5mg/Ativan 2mg/Benadryl 50mg PO/IM if QTc<500.    Plan:  [] observation- now on 7S, likely does not require 1:1 but low threshold- especially if becomes agitation/violent. Of note, be mindful for her elopement risk  - patient will likely require psychiatric admission when medically cleared  - start risperdal 2mg qhs, uptitrate as tolerated with goal of transitioning to invega RODRIGUEZ given history of noncompliance  - ativan 1mg q6h PRN for insomnia or anxiety  - PRN: Haldol 5mg PO/IM q6h PRN agitation, Benadryl 50mg PO/IM q6h PRN EPS ppx, Ativan 2mg PO/IM q6h PRN agitation           -  [monitor EKG for QTc<500, keep K>4, Mg>2]  - recommend admission to 7S  - Patient cannot leave AMA

## 2024-03-22 NOTE — H&P ADULT - ASSESSMENT
Pt is a 37 yo F with PMH BP d/o, PCOS, and anemia p/w agitation, abd pain, and nausea. Hemodynamically stable on arrival with EKG NSR, no ischemic changes. Labs largely unremarkable with CTH neg, CT a/p with fibroid uterus, and CXR neg. Psych following with recs pending.

## 2024-03-22 NOTE — H&P ADULT - NSHPSOCIALHISTORY_GEN_ALL_CORE
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

## 2024-03-22 NOTE — ED BEHAVIORAL HEALTH ASSESSMENT NOTE - OTHER PAST PSYCHIATRIC HISTORY (INCLUDE DETAILS REGARDING ONSET, COURSE OF ILLNESS, INPATIENT/OUTPATIENT TREATMENT)
Dx of Bipolar Disorder I  3 prior psych hospitalizations ([facility] 3/19/21-3/26/21, [facility] 3/9-3/13/20, [facility] 10/2020)  Most recent hospitalization ([facility] 2021) for berna/psychosis after patient was arrested for trespassing.  Not currently in treatment or taking psychotropic meds.  No history of suicidality or self-injury per chart.

## 2024-03-22 NOTE — ED ADULT NURSE NOTE - OBJECTIVE STATEMENT
Pt received in trauma A, aaox4, ambulatory, breathing even and unlabored in bed. Pt was brought in by EMS after being found wandering and screaming in the streets. Pt denies doing those actions and states she came in for abd pain and N/V. Pt denies chest pain, SOB, dizziness, headache, blurry vision, chills. Bed in lowest position, call bell within reach. Pt denies any SI/HI/AH/VH. Pt received in trauma C, aaox4, ambulatory, breathing even and unlabored in bed. Pt was brought in by EMS after being found wandering and screaming in the streets. Pt denies doing those actions and states she came in for abd pain and N/V. Pt denies chest pain, SOB, dizziness, headache, blurry vision, chills. Bed in lowest position, call bell within reach. Pt denies any SI/HI/AH/VH.

## 2024-03-22 NOTE — PHYSICAL THERAPY INITIAL EVALUATION ADULT - PERTINENT HX OF CURRENT PROBLEM, REHAB EVAL
Patient is a 38 year old female with past medical history of bipolar disorder, PCOS, and anemia presenting with agitation, abd pain, and nausea. Patient was found wandering on the street, brought in for safety. Per family, pt off all meds for an extended period and with recent hyperactivity. Per chart review, last hospitalization 3/2021 with treatment to inpatient [facility] where she was treated with abilify. Hemodynamically stable on arrival with EKG NSR, no ischemic changes. Labs largely unremarkable with CTH neg, CT A/P with fibroid uterus, and chest x-ray neg.

## 2024-03-22 NOTE — PHYSICAL THERAPY INITIAL EVALUATION ADULT - FOLLOWS COMMANDS/ANSWERS QUESTIONS, REHAB EVAL
50% of the time/able to follow single-step instructions/unable to follow multi-step instructions/unable to answer questions

## 2024-03-22 NOTE — ED BEHAVIORAL HEALTH ASSESSMENT NOTE - DESCRIPTION
PCOS, anemia Vital Signs Last 24 Hrs  T(C): 36.8 (22 Mar 2024 00:59), Max: 36.8 (22 Mar 2024 00:59)  T(F): 98.2 (22 Mar 2024 00:59), Max: 98.2 (22 Mar 2024 00:59)  HR: 93 (22 Mar 2024 00:59) (93 - 103)  BP: 145/76 (22 Mar 2024 00:59) (145/76 - 147/76)  RR: 18 (22 Mar 2024 00:59) (18 - 19)  SpO2: 100% (22 Mar 2024 00:59) (100% - 100%)  Patient On (Oxygen Delivery Method): room air    Patient remained calm in bed while in ED. 1:1 sitter for psychotic sx. No involuntary medications or restraints required. Lives with mother who is wheelchair-bound. 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

## 2024-03-22 NOTE — ED BEHAVIORAL HEALTH ASSESSMENT NOTE - VIOLENCE RISK FACTORS:
Impulsivity/Lack of insight into violence risk/need for treatment/Noncompliance with treatment/Elopement history or risk

## 2024-03-22 NOTE — H&P ADULT - PROBLEM SELECTOR PLAN 4
- Hb 7.5 with MCV 59.9  - unknown baseline, no prior labs in chart review   - received 1U RBC in ER   - f/u repeat CBC   - keep active T&S - Hb 7.5 with MCV 59.9  - unknown baseline, no prior labs in chart review   - chart documents ?hemoptysis, however, pt denies and no signs of bleeding in oral cavity  - received 1U RBC in ER   - f/u repeat CBC   - keep active T&S  - continue to monitor symptoms

## 2024-03-22 NOTE — H&P ADULT - PROBLEM SELECTOR PLAN 7
- pt with hx BP 1 d/o, previously on abilify 10mg daily with 3x psych hospitalizations; currently off all meds   - psych following, appreciate recs

## 2024-03-22 NOTE — H&P ADULT - PROBLEM SELECTOR PROBLEM 4
Microcytic anemia Purse String (Intermediate) Text: Given the location of the defect and the characteristics of the surrounding skin a purse string intermediate closure was deemed most appropriate.  Undermining was performed circumfirentially around the surgical defect.  A purse string suture was then placed and tightened.

## 2024-03-22 NOTE — PHYSICAL THERAPY INITIAL EVALUATION ADULT - ADDITIONAL COMMENTS
Unable to obtain accurate social history secondary to pt. presenting with confusion during subjective history, limited social history obtained through past medical documents. As per H&P patient was found wandering the streets. 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    Patient left semi-reclined in bed, NAD, all lines and tubes intact, RN aware

## 2024-03-22 NOTE — ED ADULT NURSE REASSESSMENT NOTE - NS ED NURSE REASSESS COMMENT FT1
Pt is A&Ox1, appears comfortable. offers no complaints at this time. breathing is even and nonlabored. Denies pain or SOB. unable to draw labs at this time, GUSTAVO Kunz to come to bedside to draw labs. safety maintained.
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
## 2024-03-22 NOTE — PHYSICAL THERAPY INITIAL EVALUATION ADULT - GAIT PATTERN USED, PT EVAL
gait not assessed due to patient with poor standing balance and retropulsion while standing with rolling walker

## 2024-03-22 NOTE — H&P ADULT - PROBLEM SELECTOR PLAN 1
- pt p/w agitation, found wandering the streets; hx of similar in the past requiring psych hospitalization, last 3/2021 @ [facility]  - hemodynamically stable on arrival   - labs largely unremarkable  - TSH WNL, ETOH neg, salicylate neg, acetaminophen neg  - UA neg, UDS neg  - CTH neg  - psych consulted, f/u recs  - PRN ativan/haldol/benadryl for agitation  - constant obs as pt with hx elopement attempts   - pt does not have capacity to leave [place] at present - pt p/w agitation, found wandering the streets; hx of similar in the past requiring psych hospitalization, last 3/2021 @ [facility]  - hemodynamically stable on arrival   - labs largely unremarkable  - TSH WNL, ETOH neg, salicylate neg, acetaminophen neg  - UA neg, UDS neg  - CTH neg  - psych consulted, f/u recs  - PRN ativan/haldol/benadryl for agitation  - constant obs as pt with hx elopement attempts   - pt does not have capacity to leave [place] at present as she lacks insight into hospitalization and events leading to hospitalization

## 2024-03-22 NOTE — ED ADULT NURSE NOTE - NSFALLUNIVINTERV_ED_ALL_ED
Bed/Stretcher in lowest position, wheels locked, appropriate side rails in place/Call bell, personal items and telephone in reach/Instruct patient to call for assistance before getting out of bed/chair/stretcher/Non-slip footwear applied when patient is off stretcher/[place] to call system/Physically safe environment - no spills, clutter or unnecessary equipment/Purposeful proactive rounding/Room/bathroom lighting operational, light cord in reach

## 2024-03-22 NOTE — PHYSICAL THERAPY INITIAL EVALUATION ADULT - MANUAL MUSCLE TESTING RESULTS, REHAB EVAL
Patients bilateral upper and left lower extremity strength grossly 4+/5, right lower extremity strength grossly 3+/5 upon MMT and functional assessment

## 2024-03-22 NOTE — ED BEHAVIORAL HEALTH ASSESSMENT NOTE - SUMMARY
Patient is a 39yo female, single, domiciled with mother, graduated from [employer] [facility], PPH of Bipolar Disorder I, 3 prior psych hospitalizations ([facility] 3/19/21-3/26/21, [facility] 3/9-3/13/20, [place] 10/2020), not currently in treatment, no h/o SA/NSSIB/SI, no h/o substance use, no known hx of violence, hx of arrest for trespassing in 2021 leading to [facility] admission, PMH PCOS and anemia, now BIBEMS after being found in street screaming.    On interview, patient was disorganized, illogical, and not fully oriented (could not state date and could not recall events leading to ED visit). She did not have overt signs of berna and no positive sx of psychosis were elicited. Of note, patient has chronic hx of treatment nonadherence and poor insight into psychiatric hx. Given patient's somatic complaints and abnormal labs including leukocytosis and anemia, recommend medical admission for further workup. For non-redirectable agitation, can consider PRN Haldol 5mg/Ativan 2mg/Benadryl 50mg PO/IM if QTc<500. Patient is a 39yo female, single, domiciled with mother, graduated from [facility], PPH of Bipolar Disorder I, 3 prior psych hospitalizations ([facility] 3/19/21-3/26/21, [facility] 3/9-3/13/20, [place] 10/2020), not currently in treatment, no h/o SA/NSSIB/SI, no h/o substance use, no known hx of violence, hx of arrest for trespassing in 2021 leading to [facility] admission, PMH PCOS and anemia, now BIBEMS after being found in street screaming.    On interview, patient was disorganized, and appears confused ,  not fully oriented (could not state date and could not recall events leading to ED visit). She did not have overt signs of berna and no positive sx of psychosis were elicited. Of note, patient has chronic hx of treatment nonadherence and poor insight into psychiatric hx. Given patient's somatic complaints and abnormal labs including leukocytosis and anemia, as well as confusion, recommend medical admission for further workup. For non-redirectable agitation, can consider PRN Haldol 5mg/Ativan 2mg/Benadryl 50mg PO/IM if QTc<500.

## 2024-03-22 NOTE — PHYSICAL THERAPY INITIAL EVALUATION ADULT - GENERAL OBSERVATIONS, REHAB EVAL
This document is complete and the patient is ready for discharge.
Patient found semi-reclined in bed, in ED, NAD, A&Ox2, confused but pleasant, OK for PT per RN Bruno, patient agreeable to participate in PT evaluation, HR 88
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
## 2024-03-22 NOTE — ED ADULT NURSE REASSESSMENT NOTE - NS ED NURSE REASSESS COMMENT FT1
blood transfusion completed. Pt appears comfortable, no signs of distress noted. Pt offers no complaints at this time. breathing is even and nonlabored. Denies pain or SOB. Plan of care ongoing. Stretcher set in lowest position, safety maintained.

## 2024-03-22 NOTE — BH CONSULTATION LIAISON PROGRESS NOTE - NSBHFUPINTERVALHXFT_PSY_A_CORE
Patient seen this AM. States that she is here to get a medical workup. Has noticeable hoarse voice which she states she has had for a long time. Denies any psychiatric history. Denies SI/HI. Denies AH/VH.

## 2024-03-23 LAB
ADD ON TEST-SPECIMEN IN LAB: SIGNIFICANT CHANGE UP
ADD ON TEST-SPECIMEN IN LAB: SIGNIFICANT CHANGE UP
ANION GAP SERPL CALC-SCNC: 17 MMOL/L — HIGH (ref 7–14)
BUN SERPL-MCNC: 6 MG/DL — LOW (ref 7–23)
CALCIUM SERPL-MCNC: 9.2 MG/DL — SIGNIFICANT CHANGE UP (ref 8.4–10.5)
CHLORIDE SERPL-SCNC: 106 MMOL/L — SIGNIFICANT CHANGE UP (ref 98–107)
CO2 SERPL-SCNC: 20 MMOL/L — LOW (ref 22–31)
CREAT SERPL-MCNC: 0.78 MG/DL — SIGNIFICANT CHANGE UP (ref 0.5–1.3)
EGFR: 100 ML/MIN/1.73M2 — SIGNIFICANT CHANGE UP
FOLATE SERPL-MCNC: 10.5 NG/ML — SIGNIFICANT CHANGE UP (ref 3.1–17.5)
GLUCOSE SERPL-MCNC: 80 MG/DL — SIGNIFICANT CHANGE UP (ref 70–99)
HCT VFR BLD CALC: 29.1 % — LOW (ref 34.5–45)
HGB BLD-MCNC: 8.3 G/DL — LOW (ref 11.5–15.5)
MAGNESIUM SERPL-MCNC: 2.3 MG/DL — SIGNIFICANT CHANGE UP (ref 1.6–2.6)
MCHC RBC-ENTMCNC: 17.8 PG — LOW (ref 27–34)
MCHC RBC-ENTMCNC: 28.5 GM/DL — LOW (ref 32–36)
MCV RBC AUTO: 62.6 FL — LOW (ref 80–100)
NRBC # BLD: 0 /100 WBCS — SIGNIFICANT CHANGE UP (ref 0–0)
NRBC # FLD: 0 K/UL — SIGNIFICANT CHANGE UP (ref 0–0)
PHOSPHATE SERPL-MCNC: 4 MG/DL — SIGNIFICANT CHANGE UP (ref 2.5–4.5)
PLATELET # BLD AUTO: 421 K/UL — HIGH (ref 150–400)
POTASSIUM SERPL-MCNC: 3.9 MMOL/L — SIGNIFICANT CHANGE UP (ref 3.5–5.3)
POTASSIUM SERPL-SCNC: 3.9 MMOL/L — SIGNIFICANT CHANGE UP (ref 3.5–5.3)
RBC # BLD: 4.65 M/UL — SIGNIFICANT CHANGE UP (ref 3.8–5.2)
RBC # FLD: 23.6 % — HIGH (ref 10.3–14.5)
SODIUM SERPL-SCNC: 143 MMOL/L — SIGNIFICANT CHANGE UP (ref 135–145)
T4 FREE SERPL-MCNC: 1.3 NG/DL — SIGNIFICANT CHANGE UP (ref 0.9–1.8)
TSH SERPL-MCNC: 8.25 UIU/ML — HIGH (ref 0.27–4.2)
VIT D25+D1,25 OH+D1,25 PNL SERPL-MCNC: 62.4 PG/ML — SIGNIFICANT CHANGE UP (ref 19.9–79.3)
WBC # BLD: 7.77 K/UL — SIGNIFICANT CHANGE UP (ref 3.8–10.5)
WBC # FLD AUTO: 7.77 K/UL — SIGNIFICANT CHANGE UP (ref 3.8–10.5)

## 2024-03-23 PROCEDURE — 99232 SBSQ HOSP IP/OBS MODERATE 35: CPT

## 2024-03-23 RX ORDER — RISPERIDONE 4 MG/1
3 TABLET ORAL AT BEDTIME
Refills: 0 | Status: DISCONTINUED | OUTPATIENT
Start: 2024-03-23 | End: 2024-03-26

## 2024-03-23 RX ADMIN — Medication 2 MILLIGRAM(S): at 00:27

## 2024-03-23 RX ADMIN — Medication 50 MILLIGRAM(S): at 00:27

## 2024-03-23 RX ADMIN — HALOPERIDOL DECANOATE 5 MILLIGRAM(S): 100 INJECTION INTRAMUSCULAR at 00:27

## 2024-03-23 RX ADMIN — BENZOCAINE AND MENTHOL 1 LOZENGE: 5; 1 LIQUID ORAL at 21:34

## 2024-03-23 RX ADMIN — BENZOCAINE AND MENTHOL 1 LOZENGE: 5; 1 LIQUID ORAL at 13:28

## 2024-03-23 RX ADMIN — RISPERIDONE 3 MILLIGRAM(S): 4 TABLET ORAL at 21:34

## 2024-03-23 RX ADMIN — BENZOCAINE AND MENTHOL 1 LOZENGE: 5; 1 LIQUID ORAL at 05:17

## 2024-03-23 NOTE — BH CONSULTATION LIAISON PROGRESS NOTE - ATTENDING COMMENTS
agree with above d/w R3 and prior psych MD patient still agitated/aggressive at night, would increase qHS risperdal to help with night time agitation, monitor for EPS, qtc prolongation, psych will follow

## 2024-03-23 NOTE — DIETITIAN INITIAL EVALUATION ADULT - PROBLEM SELECTOR PLAN 4
- Hb 7.5 with MCV 59.9  - unknown baseline, no prior labs in chart review   - chart documents ?hemoptysis, however, pt denies and no signs of bleeding in oral cavity  - received 1U RBC in ER   - f/u repeat CBC   - keep active T&S  - continue to monitor symptoms

## 2024-03-23 NOTE — PROGRESS NOTE ADULT - PROBLEM SELECTOR PLAN 3
- on arrival, pt tachycardic with leukocytosis: Resolved   - no localizing infectious complaints; abd pain as above  - no obvious infectious source   - UA neg, RVP neg  - CXR neg  - CT a/p with fibroid uterus as below   - ?reactive leukocytosis   - continue to monitor  - defer abx at present

## 2024-03-23 NOTE — DIETITIAN INITIAL EVALUATION ADULT - PROBLEM SELECTOR PLAN 1
- pt p/w agitation, found wandering the streets; hx of similar in the past requiring psych hospitalization, last 3/2021 @ [facility]  - hemodynamically stable on arrival   - labs largely unremarkable  - TSH WNL, ETOH neg, salicylate neg, acetaminophen neg  - UA neg, UDS neg  - CTH neg  - psych consulted, f/u recs  - PRN ativan/haldol/benadryl for agitation  - constant obs as pt with hx elopement attempts   - pt does not have capacity to leave [place] at present as she lacks insight into hospitalization and events leading to hospitalization

## 2024-03-23 NOTE — DIETITIAN INITIAL EVALUATION ADULT - PERTINENT MEDS FT
MEDICATIONS  (STANDING):  benzocaine/menthol Lozenge 1 Lozenge Oral every 8 hours  melatonin 3 milliGRAM(s) Oral at bedtime  risperiDONE   Tablet 3 milliGRAM(s) Oral at bedtime    MEDICATIONS  (PRN):  acetaminophen     Tablet .. 650 milliGRAM(s) Oral every 6 hours PRN Temp greater or equal to 38C (100.4F), Mild Pain (1 - 3)  aluminum hydroxide/magnesium hydroxide/simethicone Suspension 30 milliLiter(s) Oral every 4 hours PRN Dyspepsia  diphenhydrAMINE 50 milliGRAM(s) Oral every 6 hours PRN agitation  diphenhydrAMINE Injectable 50 milliGRAM(s) IV Push every 6 hours PRN agitaion  haloperidol     Tablet 5 milliGRAM(s) Oral every 6 hours PRN Agitation  haloperidol    Injectable 5 milliGRAM(s) IntraMuscular every 6 hours PRN agitation  LORazepam     Tablet 1 milliGRAM(s) Oral every 6 hours PRN anxiety or insomnia  LORazepam     Tablet 2 milliGRAM(s) Oral every 6 hours PRN Agitation  LORazepam   Injectable 2 milliGRAM(s) IV Push every 6 hours PRN Agitation  ondansetron Injectable 4 milliGRAM(s) IV Push every 8 hours PRN Nausea and/or Vomiting

## 2024-03-23 NOTE — BH CONSULTATION LIAISON PROGRESS NOTE - NSBHASSESSMENTFT_PSY_ALL_CORE
37yo female, single, domiciled with mother, [facility], PPH of Bipolar Disorder I, 3 prior psych hospitalizations ([facility] 3/19/21-3/26/21, [facility] 3/9-3/13/20, [place] 10/2020), not currently in treatment, no h/o SA/NSSIB/SI, no h/o substance use, no known hx of violence, hx of arrest for trespassing in 2021 leading to [facility] admission, PMH PCOS and anemia, now BIBEMS after being found in street screaming.    On interview, patient was disorganized, and appears confused, not fully oriented (could not state date and could not recall events leading to ED visit). She did not have overt signs of berna and no positive sx of psychosis were elicited. Of note, patient has chronic hx of treatment nonadherence and poor insight into psychiatric hx. Given patient's somatic complaints and abnormal labs including leukocytosis and anemia, as well as confusion, recommend medical admission for further workup. For non-redirectable agitation, can consider PRN Haldol 5mg/Ativan 2mg/Benadryl 50mg PO/IM if QTc<500.    Plan:  [] observation- now on 7S, likely does not require 1:1 but low threshold- especially if becomes agitation/violent. Of note, be mindful for her elopement risk  - patient will likely require psychiatric admission when medically cleared  - start risperdal 2mg qhs, uptitrate as tolerated with goal of transitioning to invega RODRIGUEZ given history of noncompliance  - ativan 1mg q6h PRN for insomnia or anxiety  - PRN: Haldol 5mg PO/IM q6h PRN agitation, Benadryl 50mg PO/IM q6h PRN EPS ppx, Ativan 2mg PO/IM q6h PRN agitation           -  [monitor EKG for QTc<500, keep K>4, Mg>2]  - recommend admission to 7S  - Patient cannot leave AMA     37yo female, single, domiciled with mother, graduated from [facility] [employer] [facility], PPH of Bipolar Disorder I, 3 prior psych hospitalizations ([facility] 3/19/21-3/26/21, [facility] 3/9-3/13/20, [place] 10/2020), not currently in treatment, no h/o SA/NSSIB/SI, no h/o substance use, no known hx of violence, hx of arrest for trespassing in 2021 leading to [facility] admission, PMH PCOS and anemia, now BIBEMS after being found in street screaming.    On interview, patient was disorganized, and appears confused, not fully oriented (could not state date and could not recall events leading to ED visit). She did not have overt signs of berna and no positive sx of psychosis were elicited. Of note, patient has chronic hx of treatment nonadherence and poor insight into psychiatric hx. Given patient's somatic complaints and abnormal labs including leukocytosis and anemia, as well as confusion, recommend medical admission for further workup. For non-redirectable agitation, can consider PRN Haldol 5mg/Ativan 2mg/Benadryl 50mg PO/IM if QTc<500.    3/23: Pt reported to be agitated overnight. Pt received PRN Haldol, Ativan, Benadryl    Plan:  [] observation- now on 7S, likely does not require 1:1 but low threshold- especially if becomes agitation/violent. Of note, be mindful for her elopement risk  - patient will likely require psychiatric admission when medically cleared  - Increase Risperdal to 3 mg qhs, uptitrate as tolerated with goal of transitioning to invega RODRIGUEZ given history of noncompliance  - ativan 1mg q6h PRN for insomnia or anxiety  - PRN: Haldol 5mg PO/IM q6h PRN agitation, Benadryl 50mg PO/IM q6h PRN EPS ppx, Ativan 2mg PO/IM q6h PRN agitation           -  [monitor EKG for QTc<500, keep K>4, Mg>2]  - Patient cannot leave AMA

## 2024-03-23 NOTE — PROGRESS NOTE ADULT - PROBLEM SELECTOR PLAN 4
- Hb 7.5 with MCV 59.9  - in 2021 was normal 11-12   - received 1U RBC in ER   - f/u repeat CBC   - keep active T&S  - continue to monitor symptoms  -transfuse for hgb < 7  follow up with iron panel
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
## 2024-03-23 NOTE — BH CONSULTATION LIAISON PROGRESS NOTE - MSE UNSTRUCTURED FT
Mental Status Exam:  [place]: poor hygiene     Behavior: calm, cooperative, no psychomotor retardation/agitation; oddly related  Motor: no tremors, EPS, or rigidity  Gait: did not assess, pt in bed  Speech: hoarse soft voice  Mood: "okay"  Affect: euthymic  Thought process: tangential  Thought Content: talks about wanting a job at [employer]  Perception: denies AH/VH  SI: denies  HI: denies  Insight: limited  Judgment: limited    Cognitive Exam:  Attention: intact  
Mental Status Exam:  [place]: poor hygiene     Behavior: calm, cooperative, no psychomotor retardation/agitation; oddly related  Motor: no tremors, EPS, or rigidity  Gait: did not assess, pt in bed  Speech: hoarse soft voice  Mood: "okay"  Affect: euthymic  Thought process: tangential  Thought Content: talks about wanting a job at [employer]  Perception: denies AH/VH  SI: denies  HI: denies  Insight: limited  Judgment: limited    Cognitive Exam:  Attention: intact

## 2024-03-23 NOTE — DIETITIAN INITIAL EVALUATION ADULT - OTHER INFO
Patient is A&O x2 at baseline.  Patient is receiving a regular diet order in house.  Patient was at good sleep when RD visited, difficult to arouse.  Per discussion with RN, Patient completed 100% dinner yesterday and was throwing stuff after dinner, was not able to verbally redirected or deescalated.  Received PRN ativan/haldol/benadryl overnight per chart review.  No observed with chewing/swallowing difficulty, no reported GI issues such as nausea/vomiting/diarrhea/constipation, on ondansetron PRN.  Incontinent of bowel/bladder noted.  Skin noted intact with 1+ generalized edema, no pressure injury per RN flowsheets.  Dosing weight@ 113kg, height 160cm, BMI 44.1-obesity.  Labs 3/23/24 reviewed notable with H/H 8.3/29.1.  TSH@ 8.25, please refer to endocrinology.  Patient not a candidate for education at this time.  RD to remain available.

## 2024-03-23 NOTE — DIETITIAN INITIAL EVALUATION ADULT - ORAL INTAKE PTA/DIET HISTORY
Patient was at good sleep when RD visited, difficult to arouse.  Information collateral with comprehensive chart review and RN discussion.

## 2024-03-23 NOTE — PROGRESS NOTE ADULT - PROBLEM SELECTOR PLAN 8
- F: s/p 1L NS  - E: replete K<4, Mg<2  - N: regular diet  - D: low improve  - G: none    code: full  dispo: pending medical optimization and psych eval; can not leave AMA at present

## 2024-03-23 NOTE — DIETITIAN INITIAL EVALUATION ADULT - REASON FOR ADMISSION
Altered mental status.  Per 3/23/24 hospitalist attending chart review, Patient is a  39 yo F with PMH BP d/o, PCOS, and anemia p/w agitation, abd pain, and nausea. Hemodynamically stable on arrival with EKG NSR, no ischemic changes. Labs largely unremarkable with CTH neg, CT a/p with fibroid uterus, and CXR neg. Psych following with recs pending.

## 2024-03-23 NOTE — DIETITIAN INITIAL EVALUATION ADULT - WEIGHT FOR BMI (KG)
Bed/Stretcher in lowest position, wheels locked, appropriate side rails in place/Call bell, personal items and telephone in reach/Instruct patient to call for assistance before getting out of bed/chair/stretcher/Non-slip footwear applied when patient is off stretcher/[place] to call system/Physically safe environment - no spills, clutter or unnecessary equipment/Purposeful proactive rounding/Room/bathroom lighting operational, light cord in reach
113

## 2024-03-23 NOTE — DIETITIAN INITIAL EVALUATION ADULT - PERTINENT LABORATORY DATA
03-23    143  |  106  |  6<L>  ----------------------------<  80  3.9   |  20<L>  |  0.78    Ca    9.2      23 Mar 2024 05:59  Phos  4.0     03-23  Mg     2.30     03-23

## 2024-03-24 LAB
ANION GAP SERPL CALC-SCNC: 11 MMOL/L — SIGNIFICANT CHANGE UP (ref 7–14)
BUN SERPL-MCNC: 10 MG/DL — SIGNIFICANT CHANGE UP (ref 7–23)
CALCIUM SERPL-MCNC: 9.2 MG/DL — SIGNIFICANT CHANGE UP (ref 8.4–10.5)
CHLORIDE SERPL-SCNC: 104 MMOL/L — SIGNIFICANT CHANGE UP (ref 98–107)
CO2 SERPL-SCNC: 24 MMOL/L — SIGNIFICANT CHANGE UP (ref 22–31)
CREAT SERPL-MCNC: 0.69 MG/DL — SIGNIFICANT CHANGE UP (ref 0.5–1.3)
CULTURE RESULTS: SIGNIFICANT CHANGE UP
EGFR: 115 ML/MIN/1.73M2 — SIGNIFICANT CHANGE UP
FERRITIN SERPL-MCNC: 11 NG/ML — LOW (ref 15–150)
GLUCOSE SERPL-MCNC: 112 MG/DL — HIGH (ref 70–99)
HCT VFR BLD CALC: 27.8 % — LOW (ref 34.5–45)
HGB BLD-MCNC: 8.1 G/DL — LOW (ref 11.5–15.5)
IRON SATN MFR SERPL: 11 UG/DL — LOW (ref 30–160)
IRON SATN MFR SERPL: 3 % — LOW (ref 14–50)
MAGNESIUM SERPL-MCNC: 2 MG/DL — SIGNIFICANT CHANGE UP (ref 1.6–2.6)
MANUAL SMEAR VERIFICATION: YES — SIGNIFICANT CHANGE UP
MCHC RBC-ENTMCNC: 17.8 PG — LOW (ref 27–34)
MCHC RBC-ENTMCNC: 29.1 GM/DL — LOW (ref 32–36)
MCV RBC AUTO: 61 FL — LOW (ref 80–100)
NRBC # BLD: 0 /100 WBCS — SIGNIFICANT CHANGE UP (ref 0–0)
NRBC # FLD: 0 K/UL — SIGNIFICANT CHANGE UP (ref 0–0)
PHOSPHATE SERPL-MCNC: 2.8 MG/DL — SIGNIFICANT CHANGE UP (ref 2.5–4.5)
PLAT MORPH BLD: ABNORMAL
PLATELET # BLD AUTO: 372 K/UL — SIGNIFICANT CHANGE UP (ref 150–400)
PLATELET CLUMP BLD QL SMEAR: SLIGHT
PLATELET COUNT - ESTIMATE: NORMAL — SIGNIFICANT CHANGE UP
POTASSIUM SERPL-MCNC: 3.7 MMOL/L — SIGNIFICANT CHANGE UP (ref 3.5–5.3)
POTASSIUM SERPL-SCNC: 3.7 MMOL/L — SIGNIFICANT CHANGE UP (ref 3.5–5.3)
RBC # BLD: 4.56 M/UL — SIGNIFICANT CHANGE UP (ref 3.8–5.2)
RBC # FLD: 23.2 % — HIGH (ref 10.3–14.5)
RBC BLD AUTO: SIGNIFICANT CHANGE UP
SODIUM SERPL-SCNC: 139 MMOL/L — SIGNIFICANT CHANGE UP (ref 135–145)
SPECIMEN SOURCE: SIGNIFICANT CHANGE UP
TIBC SERPL-MCNC: 323 UG/DL — SIGNIFICANT CHANGE UP (ref 220–430)
UIBC SERPL-MCNC: 312 UG/DL — SIGNIFICANT CHANGE UP (ref 110–370)
WBC # BLD: 9.03 K/UL — SIGNIFICANT CHANGE UP (ref 3.8–10.5)
WBC # FLD AUTO: 9.03 K/UL — SIGNIFICANT CHANGE UP (ref 3.8–10.5)

## 2024-03-24 PROCEDURE — 99232 SBSQ HOSP IP/OBS MODERATE 35: CPT

## 2024-03-24 RX ORDER — BENZOCAINE AND MENTHOL 5; 1 G/100ML; G/100ML
1 LIQUID ORAL EVERY 8 HOURS
Refills: 0 | Status: DISCONTINUED | OUTPATIENT
Start: 2024-03-24 | End: 2024-03-26

## 2024-03-24 RX ORDER — IRON SUCROSE 20 MG/ML
200 INJECTION, SOLUTION INTRAVENOUS EVERY 24 HOURS
Refills: 0 | Status: DISCONTINUED | OUTPATIENT
Start: 2024-03-24 | End: 2024-03-26

## 2024-03-24 RX ADMIN — Medication 3 MILLIGRAM(S): at 21:43

## 2024-03-24 RX ADMIN — IRON SUCROSE 110 MILLIGRAM(S): 20 INJECTION, SOLUTION INTRAVENOUS at 18:17

## 2024-03-24 RX ADMIN — BENZOCAINE AND MENTHOL 1 LOZENGE: 5; 1 LIQUID ORAL at 21:37

## 2024-03-24 RX ADMIN — BENZOCAINE AND MENTHOL 1 LOZENGE: 5; 1 LIQUID ORAL at 06:20

## 2024-03-24 RX ADMIN — RISPERIDONE 3 MILLIGRAM(S): 4 TABLET ORAL at 21:43

## 2024-03-24 RX ADMIN — BENZOCAINE AND MENTHOL 1 LOZENGE: 5; 1 LIQUID ORAL at 11:15

## 2024-03-24 NOTE — PROGRESS NOTE ADULT - PROBLEM SELECTOR PLAN 7
- pt with hx BP 1 d/o, previously on abilify 10mg daily with 3x psych hospitalizations; currently off all meds   Psych following:   -Increased Risperdal to 3 mg qhs, uptitrate as tolerated with goal of transitioning to invega RODRIGUEZ given history of noncompliance  - ativan 1mg q6h PRN for insomnia or anxiety  - PRN: Haldol 5mg PO/IM q6h PRN agitation, Benadryl 50mg PO/IM q6h PRN EPS ppx, Ativan 2mg PO/IM q6h PRN agitation    monitor qtc - pt with hx PCOS and fibroids   - CT a/p with enlarged fibroid uterus with 7.5cm central fibroid  - suspect menorrhagia, likely contributing to above anemia  - outpt f/u

## 2024-03-24 NOTE — PROGRESS NOTE ADULT - PROBLEM SELECTOR PLAN 6
- pt with hx PCOS and fibroids   - CT a/p with enlarged fibroid uterus with 7.5cm central fibroid  - suspect menorrhagia, likely contributing to above anemia  - outpt f/u -  on arrival  - f/u repeat

## 2024-03-24 NOTE — PROGRESS NOTE ADULT - PROBLEM SELECTOR PLAN 3
- on arrival, pt tachycardic with leukocytosis: Resolved   - no localizing infectious complaints; abd pain as above  - no obvious infectious source   - UA neg, RVP neg  - CXR neg  - CT a/p with fibroid uterus as below   - ?reactive leukocytosis   - continue to monitor  - defer abx at present - pt with hx BP 1 d/o, previously on abilify 10mg daily with 3x psych hospitalizations; currently off all meds   Psych following:   -Increased Risperdal to 3 mg qhs, uptitrate as tolerated with goal of transitioning to invega RODRIGUEZ given history of noncompliance  - ativan 1mg q6h PRN for insomnia or anxiety  - PRN: Haldol 5mg PO/IM q6h PRN agitation, Benadryl 50mg PO/IM q6h PRN EPS ppx, Ativan 2mg PO/IM q6h PRN agitation    monitor qtc

## 2024-03-24 NOTE — PROGRESS NOTE ADULT - PROBLEM SELECTOR PLAN 5
-  on arrival  - f/u repeat - Hb 7.5 with MCV 59.9  MENDOZA likely 2/2 menorrhagia ; iron and ferritin both 11   - in 2021 was normal 11-12   - received 1U RBC in ER   - continue to monitor symptoms  -transfuse for hgb < 7  -started IV Venofer x 5 doses

## 2024-03-25 LAB
FLUAV AG NPH QL: SIGNIFICANT CHANGE UP
FLUBV AG NPH QL: SIGNIFICANT CHANGE UP
RSV RNA NPH QL NAA+NON-PROBE: SIGNIFICANT CHANGE UP
SARS-COV-2 RNA SPEC QL NAA+PROBE: SIGNIFICANT CHANGE UP

## 2024-03-25 PROCEDURE — 99233 SBSQ HOSP IP/OBS HIGH 50: CPT

## 2024-03-25 PROCEDURE — 99232 SBSQ HOSP IP/OBS MODERATE 35: CPT

## 2024-03-25 RX ORDER — FOLIC ACID 0.8 MG
1 TABLET ORAL DAILY
Refills: 0 | Status: DISCONTINUED | OUTPATIENT
Start: 2024-03-25 | End: 2024-03-26

## 2024-03-25 RX ADMIN — Medication 1 MILLIGRAM(S): at 12:06

## 2024-03-25 RX ADMIN — BENZOCAINE AND MENTHOL 1 LOZENGE: 5; 1 LIQUID ORAL at 05:18

## 2024-03-25 RX ADMIN — BENZOCAINE AND MENTHOL 1 LOZENGE: 5; 1 LIQUID ORAL at 13:18

## 2024-03-25 NOTE — PROGRESS NOTE ADULT - PROBLEM SELECTOR PLAN 8
DVT ppx: low risk  Dispo: medically optimized; likely dc to [facility] pending bed availability  Communication: 3/25 spoke w/ patient's mother Lindsay [phone], she is not aware of pt having sickle cell trait. She "thinks" patient may have bipolar disorder. Discussed she may need inpatient psych pending further discussion with psychiatry team.

## 2024-03-25 NOTE — BH CONSULTATION LIAISON PROGRESS NOTE - NSBHASSESSMENTFT_PSY_ALL_CORE
37yo female, single, domiciled with mother, graduated from [facility] [employer], PPH of Bipolar Disorder I, 3 prior psych hospitalizations ([facility] 3/19/21-3/26/21, [facility] 3/9-3/13/20, [place] 10/2020), not currently in treatment, no h/o SA/NSSIB/SI, no h/o substance use, no known hx of violence, hx of arrest for trespassing in 2021 leading to [facility] admission, PMH PCOS and anemia, now BIBEMS after being found in street screaming.    On interview, patient was disorganized, and appears confused, not fully oriented (could not state date and could not recall events leading to ED visit). She did not have overt signs of berna and no positive sx of psychosis were elicited. Of note, patient has chronic hx of treatment nonadherence and poor insight into psychiatric hx. Given patient's somatic complaints and abnormal labs including leukocytosis and anemia, as well as confusion, recommend medical admission for further workup. For non-redirectable agitation, can consider PRN Haldol 5mg/Ativan 2mg/Benadryl 50mg PO/IM if QTc<500.    3/23: Pt reported to be agitated overnight. Pt received PRN Haldol, Ativan, Benadryl  03/25: Pt calm, cooperative, poor insight and judgement. Exacerbation of mental illness per collateral. Pending admission to [facility].    Plan:  [] observation- now on 7S, likely does not require 1:1 but low threshold- especially if becomes agitation/violent. Of note, be mindful for her elopement risk  - patient will likely require psychiatric admission when medically cleared  - Increase Risperdal to 3 mg qhs, uptitrate as tolerated with goal of transitioning to invega RODRIGUEZ given history of noncompliance  - ativan 1mg q6h PRN for insomnia or anxiety  - PRN: Haldol 5mg PO/IM q6h PRN agitation, Benadryl 50mg PO/IM q6h PRN EPS ppx, Ativan 2mg PO/IM q6h PRN agitation           -  [monitor EKG for QTc<500, keep K>4, Mg>2]  - Patient cannot leave AMA  Dispo: likely admission to inpatient psych when medically cleared.     37yo female, single, domiciled with mother, graduated from [employer], PPH of Bipolar Disorder I, 3 prior psych hospitalizations ([facility] 3/19/21-3/26/21, [facility] 3/9-3/13/20, [place] 10/2020), not currently in treatment, no h/o SA/NSSIB/SI, no h/o substance use, no known hx of violence, hx of arrest for trespassing in 2021 leading to [facility] admission, PMH PCOS and anemia, now BIBEMS after being found in street screaming.    On interview, patient was disorganized, and appears confused, not fully oriented (could not state date and could not recall events leading to ED visit). She did not have overt signs of berna and no positive sx of psychosis were elicited. Of note, patient has chronic hx of treatment nonadherence and poor insight into psychiatric hx. Given patient's somatic complaints and abnormal labs including leukocytosis and anemia, as well as confusion, recommend medical admission for further workup. For non-redirectable agitation, can consider PRN Haldol 5mg/Ativan 2mg/Benadryl 50mg PO/IM if QTc<500.    3/23: Pt reported to be agitated overnight. Pt received PRN Haldol, Ativan, Benadryl  03/25: Pt calm, cooperative, poor insight and judgement. Exacerbation of mental illness per collateral. Pending admission to [facility].    Plan:  - observation- now on 7S, likely does not require 1:1 but low threshold- especially if becomes agitation/violent. Of note, be mindful for her elopement risk  - patient will likely require psychiatric admission when medically cleared  - Continue Risperdal 3 mg qhs, up titrate as tolerated with goal of transitioning to invega RODRIGUEZ given history of noncompliance  - ativan 1mg q6h PRN for insomnia or anxiety  - PRN: Haldol 5mg PO/IM q6h PRN agitation, Benadryl 50mg PO/IM q6h PRN EPS ppx, Ativan 2mg PO/IM q6h PRN agitation           -  [monitor EKG for QTc<500, keep K>4, Mg>2]  - Patient cannot leave AMA  Dispo: likely admission to inpatient psych when medically cleared.

## 2024-03-25 NOTE — BH CONSULTATION LIAISON PROGRESS NOTE - NSBHATTESTCOMMENTATTENDFT_PSY_A_CORE
Patient seen this AM. She is oddly related. Immediately asks if this institution is hiring for a job. Denies any psychiatric history. Comments on her concern for hoarse voice. Affect is euthymic. Thought process is impaired reasoning and tangential.    Agree with plan per above.
Met with the patient along with fellow md, impression and plan discussed and agreed upon  Discussed with previous cl psych team-- below plan discussed  Coordinated with sister

## 2024-03-25 NOTE — PROGRESS NOTE ADULT - PROBLEM SELECTOR PLAN 3
- Hb 7.5 with MCV 59.9 s/p 1U PRBCs in ED w/ Hgb in ~8s   - Labs notable for MENDOZA likely 2/2 menorrhagia; iron and ferritin both 11   - in 2021 Hgb was normal 11-12   - transfuse for hgb < 7  - C/w IV Venofer x 5 doses, can dc on PO iron   - Patient endorsing sickle cell trait (mother denies, will check Hgb electrophoresis in AM)
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
## 2024-03-25 NOTE — PROGRESS NOTE ADULT - PROBLEM SELECTOR PLAN 7
- pt with hx PCOS and fibroids   - CT a/p with enlarged fibroid uterus with 7.5cm central fibroid  - suspect menorrhagia, likely contributing to above anemia  - outpt f/u with GYN encouraged

## 2024-03-26 ENCOUNTER — TRANSCRIPTION ENCOUNTER (OUTPATIENT)
Age: 39
End: 2024-03-26

## 2024-03-26 PROBLEM — D64.9 ANEMIA, UNSPECIFIED: Chronic | Status: ACTIVE | Noted: 2024-03-22

## 2024-03-26 LAB
ANION GAP SERPL CALC-SCNC: 10 MMOL/L — SIGNIFICANT CHANGE UP (ref 7–14)
BUN SERPL-MCNC: 9 MG/DL — SIGNIFICANT CHANGE UP (ref 7–23)
CALCIUM SERPL-MCNC: 9 MG/DL — SIGNIFICANT CHANGE UP (ref 8.4–10.5)
CHLORIDE SERPL-SCNC: 104 MMOL/L — SIGNIFICANT CHANGE UP (ref 98–107)
CK SERPL-CCNC: 56 U/L — SIGNIFICANT CHANGE UP (ref 25–170)
CO2 SERPL-SCNC: 23 MMOL/L — SIGNIFICANT CHANGE UP (ref 22–31)
CREAT SERPL-MCNC: 0.73 MG/DL — SIGNIFICANT CHANGE UP (ref 0.5–1.3)
EGFR: 109 ML/MIN/1.73M2 — SIGNIFICANT CHANGE UP
GLUCOSE SERPL-MCNC: 89 MG/DL — SIGNIFICANT CHANGE UP (ref 70–99)
MAGNESIUM SERPL-MCNC: 2.1 MG/DL — SIGNIFICANT CHANGE UP (ref 1.6–2.6)
PHOSPHATE SERPL-MCNC: 3.5 MG/DL — SIGNIFICANT CHANGE UP (ref 2.5–4.5)
POTASSIUM SERPL-MCNC: 4.1 MMOL/L — SIGNIFICANT CHANGE UP (ref 3.5–5.3)
POTASSIUM SERPL-SCNC: 4.1 MMOL/L — SIGNIFICANT CHANGE UP (ref 3.5–5.3)
SODIUM SERPL-SCNC: 137 MMOL/L — SIGNIFICANT CHANGE UP (ref 135–145)

## 2024-03-26 PROCEDURE — 99233 SBSQ HOSP IP/OBS HIGH 50: CPT

## 2024-03-26 PROCEDURE — 99239 HOSP IP/OBS DSCHRG MGMT >30: CPT

## 2024-03-26 RX ORDER — FERROUS SULFATE 325(65) MG
1 TABLET ORAL
Qty: 30 | Refills: 0
Start: 2024-03-26 | End: 2024-04-24

## 2024-03-26 RX ORDER — LANOLIN ALCOHOL/MO/W.PET/CERES
1 CREAM (GRAM) TOPICAL
Qty: 0 | Refills: 0 | DISCHARGE
Start: 2024-03-26

## 2024-03-26 RX ORDER — FOLIC ACID 0.8 MG
1 TABLET ORAL
Qty: 0 | Refills: 0 | DISCHARGE
Start: 2024-03-26

## 2024-03-26 RX ORDER — RISPERIDONE 4 MG/1
1 TABLET ORAL
Qty: 14 | Refills: 0
Start: 2024-03-26 | End: 2024-04-08

## 2024-03-26 RX ORDER — RISPERIDONE 4 MG/1
1 TABLET ORAL
Qty: 0 | Refills: 0 | DISCHARGE
Start: 2024-03-26

## 2024-03-26 RX ORDER — LANOLIN ALCOHOL/MO/W.PET/CERES
1 CREAM (GRAM) TOPICAL
Qty: 30 | Refills: 0
Start: 2024-03-26 | End: 2024-04-24

## 2024-03-26 RX ORDER — BENZOCAINE AND MENTHOL 5; 1 G/100ML; G/100ML
1 LIQUID ORAL
Qty: 0 | Refills: 0 | DISCHARGE
Start: 2024-03-26

## 2024-03-26 RX ORDER — FOLIC ACID 0.8 MG
1 TABLET ORAL
Qty: 30 | Refills: 0
Start: 2024-03-26 | End: 2024-04-24

## 2024-03-26 RX ORDER — FERROUS SULFATE 325(65) MG
1 TABLET ORAL
Qty: 0 | Refills: 0 | DISCHARGE

## 2024-03-26 NOTE — PROGRESS NOTE ADULT - PROBLEM SELECTOR PLAN 5
- on arrival, pt tachycardic with leukocytosis: Resolved   - no localizing infectious complaints; abd pain as above  - no obvious infectious source   - UA neg, RVP neg  - CXR neg  - CT a/p with fibroid uterus as below   - Likely reactive leukocytosis, now resolved    - continue to monitor  - defer abx at present

## 2024-03-26 NOTE — BH CONSULTATION LIAISON PROGRESS NOTE - NSBHFUPINTERVALHXFT_PSY_A_CORE
Met with the patient. Calm, engages well, denies any mood symptoms, denies any si or hi, denies any ah or vh or psychosis. Refuses a [facility] admission.   Called mother x 3 this morning (Lindsay [phone])---discussed reasons for admission. Mom asks about her presentation now. States that she has been in touch with patient daily, and finds her to be at her baseline with no acute psychiatric symptoms. Mother adamantly refuses a [facility] admission, and wants patient home. States that there is no safety concerns to her or patient. Mother reiterates this on two separate phone calls.   Called sister Lakshmi with patient's consent ([phone])---sister is a RN. States that patient has had intermittent episodes of agitation, impulsivity, erratic behaviors, has been to [facility] 2 years ago. States that she does not believe in psychiatric medication compliance, and will stop her medication upon discharge. Patient has been at her baseline for 2 years, with an episode of above similar behaviors last sunday. She states that patient is not a risk to herself or others per se. Sister would like patient to be compliant with psychiatric meds and f/u, but states that she understands that she cannot force patient. No known history of aggression or SI/SA. Sister is in agreement with a discharge. Sister will continue to check in with elderly mother. Sister knows that if behaviors emerge or concerns for patient safety she will call 911. All her questions answered  Counselled patient again this afternoon. Patient states that while she does not want psychiatric medications, she will ensure that she follow up at the crisis center. She enquires about location, hours of the CC.   Discussed above with medicine attending, discussed below disposition

## 2024-03-26 NOTE — DISCHARGE NOTE PROVIDER - DETAILS OF MALNUTRITION DIAGNOSIS/DIAGNOSES
This patient has been assessed with a concern for Malnutrition and was treated during this hospitalization for the following Nutrition diagnosis/diagnoses:     -  03/23/2024: Morbid obesity (BMI > 40)

## 2024-03-26 NOTE — DISCHARGE NOTE PROVIDER - HOSPITAL COURSE
38F with PMH Bipolar d/o (requiring prior inpatient psychiatric admissions), PCOS, and anemia p/w agitation, abd pain, and nausea. Pt found wandering on the street, brought in for safety. Per family, pt off all meds x extended period and with recent hyperactivity.     Patient found to be anemic with Hgb 7.3, given 1U PRBCs. CT a/p with enlarged fibroid uterus with 7.5cm central fibroid. Patient found to have iron deficiency anemia. Started on IV Venofer. Likely due to heavy menstrual cycles in setting of fibroid uterus. Requires outpatient follow-up.     Evaluated by psych for agitation. Patient recommended for inpatient [facility] admission. 38F with PMH Bipolar d/o (requiring prior inpatient psychiatric admissions), PCOS, and anemia p/w agitation, abd pain, and nausea. Pt found wandering on the street, brought in for safety. Per family, pt off all meds x extended period and with recent hyperactivity.     Patient found to be anemic with Hgb 7.3, given 1U PRBCs. CT a/p with enlarged fibroid uterus with 7.5cm central fibroid. Patient found to have iron deficiency anemia. Started on IV Venofer. Likely due to heavy menstrual cycles in setting of fibroid uterus. Requires outpatient follow-up.     Evaluated by psych for agitation. Family concerned about recent behaviors and ability to care for self. Patient recommended for inpatient [facility] admission, medically stable for transfer. 38F with PMH Bipolar d/o (requiring prior inpatient psychiatric admissions), PCOS, and anemia p/w agitation, abd pain, and nausea. Pt found wandering on the street, brought in for safety. Per family, pt off all meds x extended period and with recent hyperactivity.     Patient found to be anemic with Hgb 7.3, given 1U PRBCs. CT a/p with enlarged fibroid uterus with 7.5cm central fibroid. Patient found to have iron deficiency anemia. Started on IV Venofer. Likely due to heavy menstrual cycles in setting of fibroid uterus. Requires outpatient follow-up.     Evaluated by psych for agitation. Family concerned about recent behaviors and ability to care for self. Patient recommended for inpatient [facility] admission, medically stable for transfer.  Family changed mind and had no safety concerns and patient will be discharged to home. 38F with PMH Bipolar d/o (requiring prior inpatient psychiatric admissions), PCOS, and anemia p/w agitation, abd pain, and nausea. Pt found wandering on the street, brought in for safety. Per family, pt off all meds x extended period and with recent hyperactivity.           Patient found to be anemic with Hgb 7.3, given 1U PRBCs. CT a/p with enlarged fibroid uterus with 7.5cm central fibroid. Patient found to have iron deficiency anemia. Started on IV Venofer. Likely due to heavy menstrual cycles in setting of fibroid uterus. Requires outpatient follow-up.     Evaluated by psych for agitation. Family concerned about recent behaviors and ability to care for self. Patient recommended for inpatient [facility] admission, medically stable for transfer.  Family changed mind and had no safety concerns and patient will be discharged to home.

## 2024-03-26 NOTE — PROGRESS NOTE ADULT - PROBLEM SELECTOR PLAN 3
- Hb 7.5 with MCV 59.9 s/p 1U PRBCs in ED w/ Hgb in ~8s   - Labs notable for MENDOZA likely 2/2 menorrhagia; iron and ferritin both 11   - in 2021 Hgb was normal 11-12   - transfuse for hgb < 7  - C/w IV Venofer x 5 doses, can dc on PO iron   - Patient endorsing sickle cell trait (mother denies, checking Hgb electrophoresis in AM, however may not be accurate as patient had blood transfusion)

## 2024-03-26 NOTE — BH CONSULTATION LIAISON PROGRESS NOTE - CURRENT MEDICATION
MEDICATIONS  (STANDING):  benzocaine/menthol Lozenge 1 Lozenge Oral every 8 hours  melatonin 3 milliGRAM(s) Oral at bedtime  risperiDONE   Tablet 2 milliGRAM(s) Oral at bedtime    MEDICATIONS  (PRN):  acetaminophen     Tablet .. 650 milliGRAM(s) Oral every 6 hours PRN Temp greater or equal to 38C (100.4F), Mild Pain (1 - 3)  aluminum hydroxide/magnesium hydroxide/simethicone Suspension 30 milliLiter(s) Oral every 4 hours PRN Dyspepsia  diphenhydrAMINE 50 milliGRAM(s) Oral every 6 hours PRN agitation  diphenhydrAMINE Injectable 50 milliGRAM(s) IV Push every 6 hours PRN agitaion  haloperidol     Tablet 5 milliGRAM(s) Oral every 6 hours PRN Agitation  haloperidol    Injectable 5 milliGRAM(s) IntraMuscular every 6 hours PRN agitation  LORazepam     Tablet 1 milliGRAM(s) Oral every 6 hours PRN anxiety or insomnia  LORazepam     Tablet 2 milliGRAM(s) Oral every 6 hours PRN Agitation  LORazepam   Injectable 2 milliGRAM(s) IV Push every 6 hours PRN Agitation  ondansetron Injectable 4 milliGRAM(s) IV Push every 8 hours PRN Nausea and/or Vomiting  
MEDICATIONS  (STANDING):  benzocaine/menthol Lozenge 1 Lozenge Oral every 8 hours  folic acid 1 milliGRAM(s) Oral daily  iron sucrose IVPB 200 milliGRAM(s) IV Intermittent every 24 hours  melatonin 3 milliGRAM(s) Oral at bedtime  risperiDONE   Tablet 3 milliGRAM(s) Oral at bedtime    MEDICATIONS  (PRN):  acetaminophen     Tablet .. 650 milliGRAM(s) Oral every 6 hours PRN Temp greater or equal to 38C (100.4F), Mild Pain (1 - 3)  aluminum hydroxide/magnesium hydroxide/simethicone Suspension 30 milliLiter(s) Oral every 4 hours PRN Dyspepsia  diphenhydrAMINE 50 milliGRAM(s) Oral every 6 hours PRN agitation  diphenhydrAMINE Injectable 50 milliGRAM(s) IV Push every 6 hours PRN agitaion  haloperidol     Tablet 5 milliGRAM(s) Oral every 6 hours PRN Agitation  haloperidol    Injectable 5 milliGRAM(s) IntraMuscular every 6 hours PRN agitation  LORazepam     Tablet 1 milliGRAM(s) Oral every 6 hours PRN anxiety or insomnia  LORazepam     Tablet 2 milliGRAM(s) Oral every 6 hours PRN Agitation  LORazepam   Injectable 2 milliGRAM(s) IV Push every 6 hours PRN Agitation  ondansetron Injectable 4 milliGRAM(s) IV Push every 8 hours PRN Nausea and/or Vomiting  
MEDICATIONS  (STANDING):  benzocaine/menthol Lozenge 1 Lozenge Oral every 8 hours  melatonin 3 milliGRAM(s) Oral at bedtime  potassium chloride    Tablet ER 20 milliEquivalent(s) Oral once    MEDICATIONS  (PRN):  acetaminophen     Tablet .. 650 milliGRAM(s) Oral every 6 hours PRN Temp greater or equal to 38C (100.4F), Mild Pain (1 - 3)  aluminum hydroxide/magnesium hydroxide/simethicone Suspension 30 milliLiter(s) Oral every 4 hours PRN Dyspepsia  ondansetron Injectable 4 milliGRAM(s) IV Push every 8 hours PRN Nausea and/or Vomiting  
MEDICATIONS  (STANDING):  benzocaine/menthol Lozenge 1 Lozenge Oral every 8 hours  folic acid 1 milliGRAM(s) Oral daily  iron sucrose IVPB 200 milliGRAM(s) IV Intermittent every 24 hours  melatonin 3 milliGRAM(s) Oral at bedtime  risperiDONE   Tablet 3 milliGRAM(s) Oral at bedtime    MEDICATIONS  (PRN):  acetaminophen     Tablet .. 650 milliGRAM(s) Oral every 6 hours PRN Temp greater or equal to 38C (100.4F), Mild Pain (1 - 3)  aluminum hydroxide/magnesium hydroxide/simethicone Suspension 30 milliLiter(s) Oral every 4 hours PRN Dyspepsia  diphenhydrAMINE 50 milliGRAM(s) Oral every 6 hours PRN agitation  diphenhydrAMINE Injectable 50 milliGRAM(s) IV Push every 6 hours PRN agitaion  haloperidol     Tablet 5 milliGRAM(s) Oral every 6 hours PRN Agitation  haloperidol    Injectable 5 milliGRAM(s) IntraMuscular every 6 hours PRN agitation  LORazepam     Tablet 1 milliGRAM(s) Oral every 6 hours PRN anxiety or insomnia  LORazepam     Tablet 2 milliGRAM(s) Oral every 6 hours PRN Agitation  LORazepam   Injectable 2 milliGRAM(s) IV Push every 6 hours PRN Agitation  ondansetron Injectable 4 milliGRAM(s) IV Push every 8 hours PRN Nausea and/or Vomiting

## 2024-03-26 NOTE — PROGRESS NOTE ADULT - NUTRITIONAL ASSESSMENT
This patient has been assessed with a concern for Malnutrition and has been determined to have a diagnosis/diagnoses of Morbid obesity (BMI > 40).    This patient is being managed with:   Diet Regular-  Entered: Mar 22 2024 12:08PM  

## 2024-03-26 NOTE — BH CONSULTATION LIAISON PROGRESS NOTE - NSBHFUPINTERVALCCFT_PSY_A_CORE
I am here to get checked out
No prn needs in the last 3 days  Refused Risperidone yesterday  No behavioral issues, no impulsivity, no issues reported by rn  Case discussed with [address] team--- below discussed
Pt sleeping 
" I am ready to go home"

## 2024-03-26 NOTE — DISCHARGE NOTE PROVIDER - NSDCCPCAREPLAN_GEN_ALL_CORE_FT
PRINCIPAL DISCHARGE DIAGNOSIS  Diagnosis: Bipolar disorder  Assessment and Plan of Treatment:       SECONDARY DISCHARGE DIAGNOSES  Diagnosis: Anemia  Assessment and Plan of Treatment: You were found to have a low blood count due to iron deficiency. You were given a blood transfusion and IV iron. Your low blood count is likely due to heavy menstrual bleeding from your fibroid uterus. Please follow-up with your primary care doctor and your gynecologist for further management.    Diagnosis: Fibroid uterus  Assessment and Plan of Treatment: You were found to have fibroid uterus, which is likely contributing to heavy menstrual bleeding. This is making your blood counts low. It is recommended that you follow-up with Gynecology for further management of fibroids.     PRINCIPAL DISCHARGE DIAGNOSIS  Diagnosis: Bipolar disorder  Assessment and Plan of Treatment: You were evaluated by psychiatry for agitation. You will go to [facility] for further treatment. Please follow up with your psychiatrist after discharge for continued monitoring and management.      SECONDARY DISCHARGE DIAGNOSES  Diagnosis: Anemia  Assessment and Plan of Treatment: You were found to have a low blood count due to iron deficiency. You were given a blood transfusion and IV iron. Your low blood count is likely due to heavy menstrual bleeding from your fibroid uterus. Please follow-up with your primary care doctor and your gynecologist for further management.    Diagnosis: Fibroid uterus  Assessment and Plan of Treatment: You were found to have fibroid uterus, which is likely contributing to heavy menstrual bleeding. This is making your blood counts low. It is recommended that you follow-up with Gynecology for further management of fibroids.     PRINCIPAL DISCHARGE DIAGNOSIS  Diagnosis: Bipolar disorder  Assessment and Plan of Treatment: You were evaluated by psychiatry for agitation. You will go to [facility] for further treatment. Please follow up with your psychiatrist after discharge for continued monitoring and management.      SECONDARY DISCHARGE DIAGNOSES  Diagnosis: Anemia  Assessment and Plan of Treatment: You were found to have a low blood count due to iron deficiency. You were given a blood transfusion and IV iron. Your low blood count is likely due to heavy menstrual bleeding from your fibroid uterus. Please continue to use oral iron supplements daily. Please follow-up with your primary care doctor and your gynecologist for further management. Please have your blood count checked in 1-2 weeks.    Diagnosis: Fibroid uterus  Assessment and Plan of Treatment: You were found to have fibroid uterus, which is likely contributing to heavy menstrual bleeding. This is making your blood counts low. It is recommended that you follow-up with Gynecology for further management of fibroids.     PRINCIPAL DISCHARGE DIAGNOSIS  Diagnosis: Bipolar disorder  Assessment and Plan of Treatment: You were evaluated by psychiatry for agitation. You were started on Risperdal, a mood stabilizing medication, at bedtime. Please continue to take this medication as prescribed.  Please follow up with [facility] within 2 weeks of discharge for continued monitoring and management.      SECONDARY DISCHARGE DIAGNOSES  Diagnosis: Anemia  Assessment and Plan of Treatment: You were found to have a low blood count due to iron deficiency. You were given a blood transfusion and IV iron. Your low blood count is likely due to heavy menstrual bleeding from your fibroid uterus. Please continue to use oral iron supplements daily. Please follow-up with your primary care doctor and your gynecologist for further management. Please have your blood count checked in 1-2 weeks.    Diagnosis: Fibroid uterus  Assessment and Plan of Treatment: You were found to have fibroid uterus, which is likely contributing to heavy menstrual bleeding. This is making your blood counts low. It is recommended that you follow-up with Gynecology for further management of fibroids.

## 2024-03-26 NOTE — BH CONSULTATION LIAISON PROGRESS NOTE - NSBHTIMEACTIVITIESPERFORMED_PSY_A_CORE
chart review, coordinated with medicine, [address] [place] [address] team, mother x 2, sister, safety, d/c and treatment planning done
chart review, coordinated with medicine, and [address] [place] [address] team--- above plan discussed, coordinated with previous cl team, sister

## 2024-03-26 NOTE — DISCHARGE NOTE PROVIDER - NSDCMRMEDTOKEN_GEN_ALL_CORE_FT
ferrous sulfate 325 mg (65 mg elemental iron) oral tablet: 1 tab(s) orally once a day  folic acid 1 mg oral tablet: 1 tab(s) orally once a day  melatonin 3 mg oral tablet: 1 tab(s) orally once a day (at bedtime)  menthol-benzocaine topical: 1 lozenge orally every 8 hours for 7 days  risperiDONE 3 mg oral tablet: 1 tab(s) orally once a day (at bedtime)   ferrous sulfate 325 mg (65 mg elemental iron) oral tablet: 1 tab(s) orally once a day  folic acid 1 mg oral tablet: 1 tab(s) orally once a day  melatonin 3 mg oral tablet: 1 tab(s) orally once a day (at bedtime)  risperiDONE 3 mg oral tablet: 1 tab(s) orally once a day (at bedtime)

## 2024-03-26 NOTE — PROGRESS NOTE ADULT - SUBJECTIVE AND OBJECTIVE BOX
[facility] Division of Hospital Medicine  Renate Johnson MD  Hospitalist   Pager [phone]  Avaliable via MS teams     SUBJECTIVE / OVERNIGHT EVENTS: Pt seen and examined. Pt states she feels okay, knows her name and where she is. Denies any acute complaints.     ADDITIONAL REVIEW OF SYSTEMS:    MEDICATIONS  (STANDING):  benzocaine/menthol Lozenge 1 Lozenge Oral every 8 hours  melatonin 3 milliGRAM(s) Oral at bedtime  risperiDONE   Tablet 3 milliGRAM(s) Oral at bedtime    MEDICATIONS  (PRN):  acetaminophen     Tablet .. 650 milliGRAM(s) Oral every 6 hours PRN Temp greater or equal to 38C (100.4F), Mild Pain (1 - 3)  aluminum hydroxide/magnesium hydroxide/simethicone Suspension 30 milliLiter(s) Oral every 4 hours PRN Dyspepsia  diphenhydrAMINE 50 milliGRAM(s) Oral every 6 hours PRN agitation  diphenhydrAMINE Injectable 50 milliGRAM(s) IV Push every 6 hours PRN agitaion  haloperidol     Tablet 5 milliGRAM(s) Oral every 6 hours PRN Agitation  haloperidol    Injectable 5 milliGRAM(s) IntraMuscular every 6 hours PRN agitation  LORazepam     Tablet 1 milliGRAM(s) Oral every 6 hours PRN anxiety or insomnia  LORazepam     Tablet 2 milliGRAM(s) Oral every 6 hours PRN Agitation  LORazepam   Injectable 2 milliGRAM(s) IV Push every 6 hours PRN Agitation  ondansetron Injectable 4 milliGRAM(s) IV Push every 8 hours PRN Nausea and/or Vomiting      I&O's Summary      PHYSICAL EXAM:  Vital Signs Last 24 Hrs  T(C): 36.7 (23 Mar 2024 10:48), Max: 37 (22 Mar 2024 16:16)  T(F): 98 (23 Mar 2024 10:48), Max: 98.6 (22 Mar 2024 16:16)  HR: 80 (23 Mar 2024 10:48) (80 - 113)  BP: 146/80 (23 Mar 2024 10:48) (126/65 - 155/84)  BP(mean): 91 (23 Mar 2024 05:30) (91 - 91)  RR: 18 (23 Mar 2024 10:48) (18 - 18)  SpO2: 100% (23 Mar 2024 10:48) (98% - 100%)    Parameters below as of 23 Mar 2024 10:48  Patient On (Oxygen Delivery Method): room air      CONSTITUTIONAL: NAD   EYES: EOMI, PERRL  ENT: hoarse voice, no rhinorrhea, no pharyngeal erythema  RESPIRATORY: No increased work of breathing, CTAB, no wheezes or crackles appreciated  CARDIOVASCULAR: RRR, S1 and S2 present, no m/r/g  ABDOMEN: soft, NT, ND, bowel sounds present  EXTREMITIES: No LE edema  MUSCULOSKELETAL: no joint swelling, no tenderness to palpation  NEURO: AAOx2 (self, hospital)    LABS:                        8.3    7.77  )-----------( 421      ( 23 Mar 2024 05:59 )             29.1     03-23    143  |  106  |  6<L>  ----------------------------<  80  3.9   |  20<L>  |  0.78    Ca    9.2      23 Mar 2024 05:59  Phos  4.0     03-23  Mg     2.30     03-23        CARDIAC MARKERS ( 22 Mar 2024 00:27 )  x     / x     / 683 U/L / x     / x          Urinalysis Basic - ( 23 Mar 2024 05:59 )    Color: x / Appearance: x / SG: x / pH: x  Gluc: 80 mg/dL / Ketone: x  / Bili: x / Urobili: x   Blood: x / Protein: x / Nitrite: x   Leuk Esterase: x / RBC: x / WBC x   Sq Epi: x / Non Sq Epi: x / Bacteria: x    
[facility] Division of Hospital Medicine  Renate Johnson MD  Hospitalist   Pager [phone]  Avaliable via MS teams     SUBJECTIVE / OVERNIGHT EVENTS: Pt seen and examined. Pt states she feels good, without any complaints. Pt is Aox3, however states shes never had a psych problem and wants to go home.     ADDITIONAL REVIEW OF SYSTEMS:    MEDICATIONS  (STANDING):  benzocaine/menthol Lozenge 1 Lozenge Oral every 8 hours  melatonin 3 milliGRAM(s) Oral at bedtime  risperiDONE   Tablet 3 milliGRAM(s) Oral at bedtime    MEDICATIONS  (PRN):  acetaminophen     Tablet .. 650 milliGRAM(s) Oral every 6 hours PRN Temp greater or equal to 38C (100.4F), Mild Pain (1 - 3)  aluminum hydroxide/magnesium hydroxide/simethicone Suspension 30 milliLiter(s) Oral every 4 hours PRN Dyspepsia  diphenhydrAMINE 50 milliGRAM(s) Oral every 6 hours PRN agitation  diphenhydrAMINE Injectable 50 milliGRAM(s) IV Push every 6 hours PRN agitaion  haloperidol     Tablet 5 milliGRAM(s) Oral every 6 hours PRN Agitation  haloperidol    Injectable 5 milliGRAM(s) IntraMuscular every 6 hours PRN agitation  LORazepam     Tablet 1 milliGRAM(s) Oral every 6 hours PRN anxiety or insomnia  LORazepam     Tablet 2 milliGRAM(s) Oral every 6 hours PRN Agitation  LORazepam   Injectable 2 milliGRAM(s) IV Push every 6 hours PRN Agitation  ondansetron Injectable 4 milliGRAM(s) IV Push every 8 hours PRN Nausea and/or Vomiting      I&O's Summary      PHYSICAL EXAM:  Vital Signs Last 24 Hrs  T(C): 36.9 (24 Mar 2024 11:18), Max: 36.9 (24 Mar 2024 11:18)  T(F): 98.5 (24 Mar 2024 11:18), Max: 98.5 (24 Mar 2024 11:18)  HR: 93 (24 Mar 2024 11:18) (92 - 93)  BP: 131/80 (24 Mar 2024 11:18) (131/80 - 136/70)  BP(mean): --  RR: 18 (24 Mar 2024 11:18) (18 - 18)  SpO2: 100% (24 Mar 2024 11:18) (99% - 100%)    Parameters below as of 24 Mar 2024 11:18  Patient On (Oxygen Delivery Method): room air          CONSTITUTIONAL: NAD   EYES: EOMI, PERRL  ENT: hoarse voice, no rhinorrhea, no pharyngeal erythema  RESPIRATORY: No increased work of breathing, CTAB, no wheezes or crackles appreciated  CARDIOVASCULAR: RRR, S1 and S2 present, no m/r/g  ABDOMEN: soft, NT, ND, bowel sounds present  EXTREMITIES: No LE edema  MUSCULOSKELETAL: no joint swelling, no tenderness to palpation  NEURO: AAOx3      LABS:                          8.1    9.03  )-----------( x        ( 24 Mar 2024 12:24 )             27.8     03-24    139  |  104  |  10  ----------------------------<  112<H>  3.7   |  24  |  0.69    Ca    9.2      24 Mar 2024 12:24  Phos  2.8     03-24  Mg     2.00     03-24                    CARDIAC MARKERS ( 22 Mar 2024 00:27 )  x     / x     / 683 U/L / x     / x          Urinalysis Basic - ( 23 Mar 2024 05:59 )    Color: x / Appearance: x / SG: x / pH: x  Gluc: 80 mg/dL / Ketone: x  / Bili: x / Urobili: x   Blood: x / Protein: x / Nitrite: x   Leuk Esterase: x / RBC: x / WBC x   Sq Epi: x / Non Sq Epi: x / Bacteria: x    
[facility] Division of Hospital Medicine  Roseline Leavitt MD  Available on [employer] TEAMS    SUBJECTIVE / OVERNIGHT EVENTS: Patient seen and examined. Reports that she is feeling well. States that she wants to go home. States that she has no psychiatric history and does not need to be admitted to a psychiatric hospital.        MEDICATIONS  (STANDING):  benzocaine/menthol Lozenge 1 Lozenge Oral every 8 hours  folic acid 1 milliGRAM(s) Oral daily  iron sucrose IVPB 200 milliGRAM(s) IV Intermittent every 24 hours  melatonin 3 milliGRAM(s) Oral at bedtime  risperiDONE   Tablet 3 milliGRAM(s) Oral at bedtime    MEDICATIONS  (PRN):  acetaminophen     Tablet .. 650 milliGRAM(s) Oral every 6 hours PRN Temp greater or equal to 38C (100.4F), Mild Pain (1 - 3)  aluminum hydroxide/magnesium hydroxide/simethicone Suspension 30 milliLiter(s) Oral every 4 hours PRN Dyspepsia  diphenhydrAMINE 50 milliGRAM(s) Oral every 6 hours PRN agitation  diphenhydrAMINE Injectable 50 milliGRAM(s) IV Push every 6 hours PRN agitaion  haloperidol     Tablet 5 milliGRAM(s) Oral every 6 hours PRN Agitation  haloperidol    Injectable 5 milliGRAM(s) IntraMuscular every 6 hours PRN agitation  LORazepam     Tablet 1 milliGRAM(s) Oral every 6 hours PRN anxiety or insomnia  LORazepam     Tablet 2 milliGRAM(s) Oral every 6 hours PRN Agitation  LORazepam   Injectable 2 milliGRAM(s) IV Push every 6 hours PRN Agitation  ondansetron Injectable 4 milliGRAM(s) IV Push every 8 hours PRN Nausea and/or Vomiting      I&O's Summary      PHYSICAL EXAM:  Vital Signs Last 24 Hrs  T(C): 36.8 (26 Mar 2024 05:00), Max: 36.8 (26 Mar 2024 05:00)  T(F): 98.2 (26 Mar 2024 05:00), Max: 98.2 (26 Mar 2024 05:00)  HR: 78 (26 Mar 2024 05:00) (78 - 93)  BP: 126/71 (26 Mar 2024 05:00) (126/71 - 133/84)  BP(mean): --  RR: 18 (26 Mar 2024 05:00) (18 - 18)  SpO2: 100% (26 Mar 2024 05:00) (97% - 100%)    Parameters below as of 26 Mar 2024 05:00  Patient On (Oxygen Delivery Method): room air      CONSTITUTIONAL: NAD, well-developed, well-groomed  ENMT: hirsutism   RESPIRATORY: Normal respiratory effort; lungs are clear to auscultation bilaterally; No wheezes or rales  CARDIOVASCULAR: Regular rate and rhythm; Normal S1 and S2; No murmurs, rubs, or gallops; No lower extremity edema  ABDOMEN: Soft, Nontender, Nondistended; Bowel sounds present  MUSCULOSKELETAL:  No clubbing or cyanosis of digits; No joint swelling or tenderness to palpation  PSYCH: calm and cooperative   NEUROLOGY: No focal deficits  SKIN: No rashes; No palpable lesions      LABS:                        8.1    9.03  )-----------( 372      ( 24 Mar 2024 12:24 )             27.8     03-26    137  |  104  |  9   ----------------------------<  89  4.1   |  23  |  0.73    Ca    9.0      26 Mar 2024 06:00  Phos  3.5     03-26  Mg     2.10     03-26        CARDIAC MARKERS ( 26 Mar 2024 06:00 )  x     / x     / 56 U/L / x     / x          Urinalysis Basic - ( 26 Mar 2024 06:00 )    Color: x / Appearance: x / SG: x / pH: x  Gluc: 89 mg/dL / Ketone: x  / Bili: x / Urobili: x   Blood: x / Protein: x / Nitrite: x   Leuk Esterase: x / RBC: x / WBC x   Sq Epi: x / Non Sq Epi: x / Bacteria: x      
[facility] Division of Hospital Medicine  Roseline Leavitt MD  Available on [email or web address] TEAMS    SUBJECTIVE / OVERNIGHT EVENTS: Patient seen and examined. Reports that she has sickle cell trait and that she is supposed to be on folic acid but does not take it. Reports she has never had a blood transfusion prior to this admission. Reports her blood count is low due to sickle cell trait and also endorses very heavy menstrual bleeding every cycle. Denies chest pain, shortness of breath, lightheadedness or dizziness. Discussed recommendation for outpatient follow-up with GYN for fibroids, likely contributing to heavy menstrual bleeding and anemia.       MEDICATIONS  (STANDING):  benzocaine/menthol Lozenge 1 Lozenge Oral every 8 hours  folic acid 1 milliGRAM(s) Oral daily  iron sucrose IVPB 200 milliGRAM(s) IV Intermittent every 24 hours  melatonin 3 milliGRAM(s) Oral at bedtime  risperiDONE   Tablet 3 milliGRAM(s) Oral at bedtime    MEDICATIONS  (PRN):  acetaminophen     Tablet .. 650 milliGRAM(s) Oral every 6 hours PRN Temp greater or equal to 38C (100.4F), Mild Pain (1 - 3)  aluminum hydroxide/magnesium hydroxide/simethicone Suspension 30 milliLiter(s) Oral every 4 hours PRN Dyspepsia  diphenhydrAMINE 50 milliGRAM(s) Oral every 6 hours PRN agitation  diphenhydrAMINE Injectable 50 milliGRAM(s) IV Push every 6 hours PRN agitaion  haloperidol     Tablet 5 milliGRAM(s) Oral every 6 hours PRN Agitation  haloperidol    Injectable 5 milliGRAM(s) IntraMuscular every 6 hours PRN agitation  LORazepam     Tablet 1 milliGRAM(s) Oral every 6 hours PRN anxiety or insomnia  LORazepam     Tablet 2 milliGRAM(s) Oral every 6 hours PRN Agitation  LORazepam   Injectable 2 milliGRAM(s) IV Push every 6 hours PRN Agitation  ondansetron Injectable 4 milliGRAM(s) IV Push every 8 hours PRN Nausea and/or Vomiting      PHYSICAL EXAM:  Vital Signs Last 24 Hrs  T(C): 36.4 (25 Mar 2024 11:01), Max: 37 (24 Mar 2024 19:27)  T(F): 97.5 (25 Mar 2024 11:01), Max: 98.6 (24 Mar 2024 19:27)  HR: 93 (25 Mar 2024 11:01) (91 - 105)  BP: 133/84 (25 Mar 2024 11:01) (131/79 - 137/63)  BP(mean): --  RR: 18 (25 Mar 2024 11:01) (18 - 18)  SpO2: 97% (25 Mar 2024 11:01) (97% - 100%)    Parameters below as of 25 Mar 2024 11:01  Patient On (Oxygen Delivery Method): room air      CONSTITUTIONAL: NAD, well-developed, well-groomed  ENMT: hirsutism   RESPIRATORY: Normal respiratory effort; lungs are clear to auscultation bilaterally; No wheezes or rales  CARDIOVASCULAR: Regular rate and rhythm; Normal S1 and S2; No murmurs, rubs, or gallops; No lower extremity edema  ABDOMEN: Soft, Nontender, Nondistended; Bowel sounds present  MUSCULOSKELETAL:  No clubbing or cyanosis of digits; No joint swelling or tenderness to palpation  PSYCH: calm and cooperative   NEUROLOGY: No focal deficits  SKIN: No rashes; No palpable lesions    LABS:                        8.1    9.03  )-----------( 372      ( 24 Mar 2024 12:24 )             27.8     03-24    139  |  104  |  10  ----------------------------<  112<H>  3.7   |  24  |  0.69    Ca    9.2      24 Mar 2024 12:24  Phos  2.8     03-24  Mg     2.00     03-24        Urinalysis Basic - ( 24 Mar 2024 12:24 )    Color: x / Appearance: x / SG: x / pH: x  Gluc: 112 mg/dL / Ketone: x  / Bili: x / Urobili: x   Blood: x / Protein: x / Nitrite: x   Leuk Esterase: x / RBC: x / WBC x   Sq Epi: x / Non Sq Epi: x / Bacteria: x

## 2024-03-26 NOTE — PROGRESS NOTE ADULT - NSPROGADDITIONALINFOA_GEN_ALL_CORE
ADDENDUM 1:20 PM  Dispo: discussed at length w. Dr. Dumont, family does not have safety concerns for patient and mother and sister are okay with patient being discharged to home. Will dc w/ 2 week supply of Risperdal and follow-up with [facility] crisis clinic as outpatient.

## 2024-03-26 NOTE — DISCHARGE NOTE NURSING/CASE MANAGEMENT/SOCIAL WORK - NSDCPEFALRISK_GEN_ALL_CORE
For information on Fall & Injury Prevention, visit: [email or web address] OR  [email or web address] OR  https://www.cdc.gov/steadi/patient.html

## 2024-03-26 NOTE — BH CONSULTATION LIAISON PROGRESS NOTE - NSBHCONSULTFOLLOWAFTERCARE_PSY_A_CORE FT
[facility]   [address] [place] [id number]  phone: [phone]  walk ins: m-f: 9am-7pm

## 2024-03-26 NOTE — BH CONSULTATION LIAISON PROGRESS NOTE - NSBHASSESSMENTFT_PSY_ALL_CORE
37yo female, single, domiciled with mother, graduated from [employer], P[facility] of Bipolar Disorder I, 3 prior psych hospitalizations ([facility] 3/19/21-3/26/21, [facility] 3/9-3/13/20, [place] 10/2020), not currently in treatment, no h/o SA/NSSIB/SI, no h/o substance use, no known hx of violence, hx of arrest for trespassing in 2021 leading to [facility] admission, PMH PCOS and anemia, now BIBEMS after being found in street screaming.    On interview, patient was disorganized, and appears confused, not fully oriented (could not state date and could not recall events leading to ED visit). She did not have overt signs of berna and no positive sx of psychosis were elicited. Of note, patient has chronic hx of treatment nonadherence and poor insight into psychiatric hx. Given patient's somatic complaints and abnormal labs including leukocytosis and anemia, as well as confusion, recommend medical admission for further workup. For non-redirectable agitation, can consider PRN Haldol 5mg/Ativan 2mg/Benadryl 50mg PO/IM if QTc<500.    3/23: Pt reported to be agitated overnight. Pt received PRN Haldol, Ativan, Benadryl  03/25: Pt calm, cooperative, poor insight and judgement. Exacerbation of mental illness per collateral. Pending admission to [facility].    3/26----better control over behaviors, denies any mood symptoms, denies any si or hi, no psychosis. Mother wants patient home. Discussed safety, treatment and discharge planning with mother+ sister. At this time patient does not meet criteria for an involuntary psychiatric admission.     Plan:  - No need for a 1:1co    - Continue Risperdal 3 mg qhs. Counselled patient about compliance  - ativan 1mg q6h PRN for insomnia or anxiety  - PRN: Haldol 5mg PO/IM q6h PRN agitation, Benadryl 50mg PO/IM q6h PRN EPS ppx, Ativan 2mg PO/IM q6h PRN agitation           -  [monitor EKG for QTc<500, keep K>4, Mg>2]    Disposition- No indication for a psychiatric admission.

## 2024-03-26 NOTE — PROGRESS NOTE ADULT - ASSESSMENT
38F with hx of  (?)bipolar disorder, PCOS, and anemia p/w agitation, abd pain, and nausea. Found to have symptomatic anemia s/p 1U PRBCs. 
 38F with hx of  bipolar disorder, PCOS, and anemia p/w agitation, abd pain, and nausea. Found to have symptomatic anemia s/p 1U PRBCs. 
 39 yo F with PMH BP d/o, PCOS, and anemia p/w agitation, abd pain, and nausea. Hemodynamically stable on arrival with EKG NSR, no ischemic changes. Labs largely unremarkable with CTH neg, CT a/p with fibroid uterus, and CXR neg. Psych following with recs pending. 
 39 yo F with PMH BP d/o, PCOS, and anemia p/w agitation, abd pain, and nausea. Hemodynamically stable on arrival with EKG NSR, no ischemic changes. Labs largely unremarkable with CTH neg, CT a/p with fibroid uterus, and CXR neg. Psych following with recs pending.

## 2024-03-26 NOTE — BH CONSULTATION LIAISON PROGRESS NOTE - NSBHCHARTREVIEWVS_PSY_A_CORE FT
Vital Signs Last 24 Hrs  T(C): 36.9 (22 Mar 2024 11:04), Max: 36.9 (22 Mar 2024 11:04)  T(F): 98.5 (22 Mar 2024 11:04), Max: 98.5 (22 Mar 2024 11:04)  HR: 81 (22 Mar 2024 11:04) (71 - 103)  BP: 146/88 (22 Mar 2024 11:04) (123/58 - 152/85)  BP(mean): --  RR: 18 (22 Mar 2024 11:04) (14 - 19)  SpO2: 100% (22 Mar 2024 11:04) (100% - 100%)    Parameters below as of 22 Mar 2024 11:04  Patient On (Oxygen Delivery Method): room air    
Vital Signs Last 24 Hrs  T(C): 36.7 (23 Mar 2024 10:48), Max: 37 (22 Mar 2024 16:16)  T(F): 98 (23 Mar 2024 10:48), Max: 98.6 (22 Mar 2024 16:16)  HR: 80 (23 Mar 2024 10:48) (80 - 113)  BP: 146/80 (23 Mar 2024 10:48) (126/65 - 155/84)  BP(mean): 91 (23 Mar 2024 05:30) (91 - 91)  RR: 18 (23 Mar 2024 10:48) (18 - 18)  SpO2: 100% (23 Mar 2024 10:48) (98% - 100%)    Parameters below as of 23 Mar 2024 10:48  Patient On (Oxygen Delivery Method): room air    
Vital Signs Last 24 Hrs  T(C): 36.7 (26 Mar 2024 11:07), Max: 36.8 (26 Mar 2024 05:00)  T(F): 98 (26 Mar 2024 11:07), Max: 98.2 (26 Mar 2024 05:00)  HR: 75 (26 Mar 2024 11:07) (75 - 80)  BP: 126/70 (26 Mar 2024 11:07) (126/70 - 130/81)  BP(mean): --  RR: 18 (26 Mar 2024 11:07) (18 - 18)  SpO2: 100% (26 Mar 2024 11:07) (100% - 100%)    Parameters below as of 26 Mar 2024 11:07  Patient On (Oxygen Delivery Method): room air    
Vital Signs Last 24 Hrs  T(C): 36.8 (25 Mar 2024 04:45), Max: 37 (24 Mar 2024 19:27)  T(F): 98.2 (25 Mar 2024 04:45), Max: 98.6 (24 Mar 2024 19:27)  HR: 91 (25 Mar 2024 04:45) (91 - 105)  BP: 137/63 (25 Mar 2024 04:45) (131/79 - 137/63)  BP(mean): --  RR: 18 (25 Mar 2024 04:45) (18 - 18)  SpO2: 100% (25 Mar 2024 04:45) (100% - 100%)    Parameters below as of 25 Mar 2024 04:45  Patient On (Oxygen Delivery Method): room air

## 2024-03-26 NOTE — PROGRESS NOTE ADULT - PROBLEM SELECTOR PLAN 8
DVT ppx: low risk  Dispo: medically optimized; likely dc to [facility] pending bed availability DVT ppx: low risk  Dispo: medically optimized; sign-out given to [facility] hospitalist. Patient stable for inpatient [facility] admission. DVT ppx: low risk

## 2024-03-26 NOTE — DISCHARGE NOTE NURSING/CASE MANAGEMENT/SOCIAL WORK - PATIENT PORTAL LINK FT
You can access the FollowMyHealth Patient Portal offered by [facility] by registering at the following website: [email or web address]. By joining [facility]’s FollowMyHealth portal, you will also be able to view your health information using other applications (apps) compatible with our system.

## 2024-03-26 NOTE — DISCHARGE NOTE PROVIDER - NSFOLLOWUPCLINICS_GEN_ALL_ED_FT
[facility]  Psychiatry  [address] [place] [address]  Phone: [phone]  Fax:

## 2024-03-26 NOTE — BH CONSULTATION LIAISON PROGRESS NOTE - NSBHATTESTBILLING_PSY_A_CORE
35789-Uxeirflrum OBS or IP - moderate complexity OR 35-49 mins
32607-Fhdrnejukb OBS or IP - high complexity OR 50-79 mins
95735-Ggzkhektua OBS or IP - high complexity OR 50-79 mins
Non-billable

## 2024-03-26 NOTE — BH CONSULTATION LIAISON PROGRESS NOTE - NSBHCHARTREVIEWLAB_PSY_A_CORE FT
8.3    7.77  )-----------( 421      ( 23 Mar 2024 05:59 )             29.1   03-23    143  |  106  |  6<L>  ----------------------------<  80  3.9   |  20<L>  |  0.78    Ca    9.2      23 Mar 2024 05:59  Phos  4.0     03-23  Mg     2.30     03-23    
03-26    137  |  104  |  9   ----------------------------<  89  4.1   |  23  |  0.73    Ca    9.0      26 Mar 2024 06:00  Phos  3.5     03-26  Mg     2.10     03-26    
                      8.3    7.77  )-----------( 421      ( 23 Mar 2024 05:59 )             29.1   03-23    143  |  106  |  6<L>  ----------------------------<  80  3.9   |  20<L>  |  0.78    Ca    9.2      23 Mar 2024 05:59  Phos  4.0     03-23  Mg     2.30     03-23

## 2024-03-26 NOTE — PROGRESS NOTE ADULT - PROBLEM SELECTOR PLAN 1
- pt p/w agitation, found wandering the streets; hx of similar in the past requiring psych hospitalization, last 3/2021 @ [facility]  - ETOH neg, salicylate neg, acetaminophen neg  - TSH normal on admission  - UA neg, UDS neg  - CTH neg  - psych consulted, discussed w/ Dr. Dumont, patient needs inpatient psych admission for further management    - On Risperdal 3mg qhs  - PRN ativan/haldol/benadryl for agitation  - pt does not have capacity to leave [place] at present as she lacks insight into hospitalization and events leading to hospitalization
- pt p/w agitation, found wandering the streets; hx of similar in the past requiring psych hospitalization, last 3/2021 @ [facility]  - hemodynamically stable on arrival   - labs largely unremarkable  - ETOH neg, salicylate neg, acetaminophen neg  -TSH normal on admission, unclear why it was repeated, but repeat is elevated to 8, f/up with free t4   - UA neg, UDS neg  - CTH neg  - psych consulted, f/u recs  - PRN ativan/haldol/benadryl for agitation  - constant obs as pt with hx elopement attempts   - pt does not have capacity to leave [place] at present as she lacks insight into hospitalization and events leading to hospitalization
- pt p/w agitation, found wandering the streets; hx of similar in the past requiring psych hospitalization, last 3/2021 @ [facility]  - ETOH neg, salicylate neg, acetaminophen neg  - TSH normal on admission  - UA neg, UDS neg  - CTH neg  - psych consulted, discussed w/ Dr. Dumont, likely needs inpatient psych admission for further management    - On Risperdal 3mg qhs  - PRN ativan/haldol/benadryl for agitation  - pt does not have capacity to leave [place] at present as she lacks insight into hospitalization and events leading to hospitalization
- pt p/w agitation, found wandering the streets; hx of similar in the past requiring psych hospitalization, last 3/2021 @ [facility]  - hemodynamically stable on arrival   - labs largely unremarkable  - ETOH neg, salicylate neg, acetaminophen neg  -TSH normal on admission, unclear why it was repeated, but repeat is elevated to 8, f/up with free t4   - UA neg, UDS neg  - CTH neg  - psych consulted, f/u recs  - PRN ativan/haldol/benadryl for agitation  - constant obs as pt with hx elopement attempts   - pt does not have capacity to leave [place] at present as she lacks insight into hospitalization and events leading to hospitalization

## 2024-03-26 NOTE — PROGRESS NOTE ADULT - PROBLEM SELECTOR PLAN 2
- pt with hx BP 1 d/o, previously on abilify 10mg daily with 3x psych hospitalizations; currently off all meds   Psych following:   -Increased Risperdal to 3 mg qhs, uptitrate as tolerated with goal of transitioning to invega RODRIGUEZ given history of noncompliance  - ativan 1mg q6h PRN for insomnia or anxiety  - PRN: Haldol 5mg PO/IM q6h PRN agitation, Benadryl 50mg PO/IM q6h PRN EPS ppx, Ativan 2mg PO/IM q6h PRN agitation    - Will likely require inpatient psych admission for further management
- pt p/w abd pain and nausea, witnessed to be "spitting up sputum" per ER notes   - on arrival, meeting SIRS criteria as below  - afebrile and non-toxic appearing   - abd exam benign  - UA neg  - CT a/p with enlarged fibroid uterus with 7.5cm central fibroid  - s/p 1L NS in ER  - continue to monitor  - tylenol PRN pain  - maalox PRN   - defer abx
- pt with hx BP 1 d/o, previously on abilify 10mg daily with 3x psych hospitalizations; currently off all meds   Psych following:   -Increased Risperdal to 3 mg qhs, uptitrate as tolerated with goal of transitioning to invega RODRIGUZE given history of noncompliance  - ativan 1mg q6h PRN for insomnia or anxiety  - PRN: Haldol 5mg PO/IM q6h PRN agitation, Benadryl 50mg PO/IM q6h PRN EPS ppx, Ativan 2mg PO/IM q6h PRN agitation
- pt p/w abd pain and nausea, witnessed to be "spitting up sputum" per ER notes   - on arrival, meeting SIRS criteria as below  - afebrile and non-toxic appearing   - abd exam benign  - UA neg  - CT a/p with enlarged fibroid uterus with 7.5cm central fibroid  - s/p 1L NS in ER  - continue to monitor  - tylenol PRN pain  - maalox PRN   - defer abx

## 2024-03-26 NOTE — BH CONSULTATION LIAISON PROGRESS NOTE - NSBHATTESTTYPEVISIT_PSY_A_CORE
Attending Only
Attending with Resident/Fellow/Student
Resident/Fellow with telephonic supervision
Attending with Resident/Fellow/Student

## 2024-03-26 NOTE — DISCHARGE NOTE NURSING/CASE MANAGEMENT/SOCIAL WORK - NSDCVIVACCINE_GEN_ALL_CORE_FT
COVID-19 vaccine, vector-nr, rS-Ad26, PF, 0.5 mL (Gera); 26-Mar-2021 12:13; Estephania Ya (BEATRICE); [email or web address]; [id number] (Exp. Date: 25-May-2021); IntraMuscular; Deltoid Right.; 0.5 milliLiter(s);

## 2024-03-27 VITALS
DIASTOLIC BLOOD PRESSURE: 61 MMHG | RESPIRATION RATE: 18 BRPM | OXYGEN SATURATION: 100 % | SYSTOLIC BLOOD PRESSURE: 108 MMHG | HEART RATE: 64 BPM | TEMPERATURE: 98 F

## 2024-03-27 LAB
HEMOGLOBIN INTERPRETATION: SIGNIFICANT CHANGE UP
HGB A MFR BLD: 68.7 % — LOW (ref 95–97.6)
HGB A2 MFR BLD: 2.7 % — SIGNIFICANT CHANGE UP (ref 2.4–3.5)
HGB F MFR BLD: <1 % — SIGNIFICANT CHANGE UP (ref 0–1.5)
HGB S BLD QL: POSITIVE
HGB S MFR BLD: 28.3 % — HIGH
SOLUBILITY: POSITIVE

## 2024-04-17 NOTE — ED BEHAVIORAL HEALTH ASSESSMENT NOTE - SUBSTANCE ISSUES AND PLAN (INCLUDE STANDING AND PRN MEDICATION)
Trauma Surgery   Progress Note  Admit Date: 3/26/2024  HD#22  POD#21 Days Post-Op    Subjective:   Interval history:  Afebrile. VSS. O2 sat stable on RA overnight.      Home Meds:   Current Outpatient Medications   Medication Instructions    HYDROcodone-acetaminophen (NORCO) 7.5-325 mg per tablet 1 tablet, Oral, Every 6 hours PRN    olmesartan-hydrochlorothiazide (BENICAR HCT) 20-12.5 mg per tablet 1 tablet, Oral, Daily    sildenafiL (VIAGRA) 50 mg, Oral, Daily PRN      Scheduled Meds:  Current Facility-Administered Medications   Medication Dose Route Frequency Provider Last Rate Last Admin    bacitracin ointment   Topical (Top) TID Margarita Mueller AGACNP-BC   Given at 04/16/24 2055    bisacodyL suppository 10 mg  10 mg Rectal Daily PRN Margarita Mueller AGACNP-BC        calcium-vitamin D3 500 mg-5 mcg (200 unit) per tablet 2 tablet  2 tablet Oral Daily Margarita Mueller AGACNP-BC   2 tablet at 04/16/24 0834    dextromethorphan-guaiFENesin  mg per 12 hr tablet 1 tablet  1 tablet Oral BID Margarita Mueller AGACNP-BC        dextrose 10% bolus 125 mL 125 mL  12.5 g Intravenous PRN Margarita Mueller AGACNP-BC        dextrose 10% bolus 250 mL 250 mL  25 g Intravenous PRN Margarita Mueller AGACNP-BC        docusate 50 mg/5 mL liquid 100 mg  100 mg Oral BID Rachel, Jeanette, NP   100 mg at 04/16/24 2055    doxazosin tablet 1 mg  1 mg Oral Daily Frederick Ramosqueline, NP   1 mg at 04/16/24 0834    enoxaparin injection 40 mg  40 mg Subcutaneous Q12H (prophylaxis, 0900/2100) Margarita Mueller AGACNP-BC   40 mg at 04/16/24 2055    glucagon (human recombinant) injection 1 mg  1 mg Intramuscular PRN Margarita Mueller AGACNP-BC        hydrALAZINE injection 10 mg  10 mg Intravenous Q6H PRN Margarita Mueller AGAEDGARDOP-BC   10 mg at 03/31/24 0039    insulin aspart U-100 injection 0-5 Units  0-5 Units Subcutaneous Q6H PRN Margarita Mueller, AGACNP-BC   2 Units at 04/10/24 0614    labetaloL injection 10 mg  10 mg Intravenous  Q6H PRN Margarita Mueller AGACNP-BC        LIDOcaine 5 % patch 2 patch  2 patch Transdermal Q24H Margarita Mueller AGACNDOC-BC   2 patch at 04/16/24 1125    LORazepam tablet 1 mg  1 mg Oral Q6H PRN RachelFrederick parishJeanette, NP        melatonin tablet 6 mg  6 mg Oral Nightly PRN Rachel, Jeanette, NP   6 mg at 04/16/24 2055    methocarbamoL tablet 500 mg  500 mg Oral TID Rachel, Jeanette, NP   500 mg at 04/16/24 2055    modafiniL tablet 200 mg  200 mg Oral Daily Rachel, Jeanette, NP   200 mg at 04/16/24 0852    multivitamin tablet  1 tablet Oral Daily Rachel, Jeanette, NP   1 tablet at 04/16/24 0834    ondansetron injection 4 mg  4 mg Intravenous Q6H PRN Margarita Mueller AGACNP-BC   4 mg at 03/27/24 0110    oxyCODONE immediate release tablet 5 mg  5 mg Oral Q4H PRN RachelFrederick parishJeanette, NP        polyethylene glycol packet 17 g  17 g Oral BID Rachel, Jeanette, NP   17 g at 04/16/24 2055    propranoloL tablet 20 mg  20 mg Oral BID Rachel, Jeanette, NP   20 mg at 04/16/24 2055    QUEtiapine tablet 25 mg  25 mg Oral QHS Rachel, Jeanette, NP   25 mg at 04/16/24 2055     Continuous Infusions:  Current Facility-Administered Medications   Medication Dose Route Frequency Provider Last Rate Last Admin    bacitracin ointment   Topical (Top) TID Margarita Mueller AGACNP-BC   Given at 04/16/24 2055    bisacodyL suppository 10 mg  10 mg Rectal Daily PRN Margarita Mueller AGACNP-BC        calcium-vitamin D3 500 mg-5 mcg (200 unit) per tablet 2 tablet  2 tablet Oral Daily Margarita Mueller AGACNP-BC   2 tablet at 04/16/24 0834    dextromethorphan-guaiFENesin  mg per 12 hr tablet 1 tablet  1 tablet Oral BID Margarita Mueller AGACNP-BC        dextrose 10% bolus 125 mL 125 mL  12.5 g Intravenous PRN Margarita Mueller, AGACNP-BC        dextrose 10% bolus 250 mL 250 mL  25 g Intravenous PRN Margarita Mueller, AGACNP-BC        docusate 50 mg/5 mL liquid 100 mg  100 mg Oral BID Jeanette Ramos, NP   100 mg at 04/16/24  2055    doxazosin tablet 1 mg  1 mg Oral Daily Glenna Ramosline, NP   1 mg at 04/16/24 0834    enoxaparin injection 40 mg  40 mg Subcutaneous Q12H (prophylaxis, 0900/2100) Margarita Mueller AGACNP-BC   40 mg at 04/16/24 2055    glucagon (human recombinant) injection 1 mg  1 mg Intramuscular PRN Margarita Mueller, AGACNP-BC        hydrALAZINE injection 10 mg  10 mg Intravenous Q6H PRN Margarita Mueller, AGACNP-BC   10 mg at 03/31/24 0039    insulin aspart U-100 injection 0-5 Units  0-5 Units Subcutaneous Q6H PRN Margarita Mueller, AGACNP-BC   2 Units at 04/10/24 0614    labetaloL injection 10 mg  10 mg Intravenous Q6H PRN Margarita Mueller AGAEDGARDOP-BC        LIDOcaine 5 % patch 2 patch  2 patch Transdermal Q24H Margarita Mueller AGACNP-BC   2 patch at 04/16/24 1125    LORazepam tablet 1 mg  1 mg Oral Q6H PRN Jeanette Ramos, NP        melatonin tablet 6 mg  6 mg Oral Nightly PRN Glenna Ramosline, NP   6 mg at 04/16/24 2055    methocarbamoL tablet 500 mg  500 mg Oral TID Glenna Ramosline, NP   500 mg at 04/16/24 2055    modafiniL tablet 200 mg  200 mg Oral Daily Glenna Ramosline, NP   200 mg at 04/16/24 0852    multivitamin tablet  1 tablet Oral Daily Frederick Ramosqueline, NP   1 tablet at 04/16/24 0834    ondansetron injection 4 mg  4 mg Intravenous Q6H PRN Margarita Mueller, AGACNP-BC   4 mg at 03/27/24 0110    oxyCODONE immediate release tablet 5 mg  5 mg Oral Q4H PRN Frederick Ramosqueline, NP        polyethylene glycol packet 17 g  17 g Oral BID Rachel, Jeanette, NP   17 g at 04/16/24 2055    propranoloL tablet 20 mg  20 mg Oral BID Rachel, Jeanette, NP   20 mg at 04/16/24 2055    QUEtiapine tablet 25 mg  25 mg Oral QHS Jeanette Ramos NP   25 mg at 04/16/24 2055     PRN Meds:  Current Facility-Administered Medications   Medication Dose Route Frequency Provider Last Rate Last Admin    bacitracin ointment   Topical (Top) TID Margarita Mueller, AGACNP-BC   Given at 04/16/24 2055    bisacodyL  suppository 10 mg  10 mg Rectal Daily PRN Margarita Mueller, AGACNP-BC        calcium-vitamin D3 500 mg-5 mcg (200 unit) per tablet 2 tablet  2 tablet Oral Daily Margarita Mueller, AGACNP-BC   2 tablet at 04/16/24 0834    dextromethorphan-guaiFENesin  mg per 12 hr tablet 1 tablet  1 tablet Oral BID Margarita Mueller, ELIAZAR-BC        dextrose 10% bolus 125 mL 125 mL  12.5 g Intravenous PRN Margarita Mueller, AGACNP-BC        dextrose 10% bolus 250 mL 250 mL  25 g Intravenous PRN Margarita Mueller, AGACNP-BC        docusate 50 mg/5 mL liquid 100 mg  100 mg Oral BID Rachel, Jeanette, NP   100 mg at 04/16/24 2055    doxazosin tablet 1 mg  1 mg Oral Daily Rachel, Jeanette, NP   1 mg at 04/16/24 0834    enoxaparin injection 40 mg  40 mg Subcutaneous Q12H (prophylaxis, 0900/2100) Margarita Mueller AGACNP-BC   40 mg at 04/16/24 2055    glucagon (human recombinant) injection 1 mg  1 mg Intramuscular PRN Margarita Mueller, AGACNP-BC        hydrALAZINE injection 10 mg  10 mg Intravenous Q6H PRN Margarita Mueller, AGACNP-BC   10 mg at 03/31/24 0039    insulin aspart U-100 injection 0-5 Units  0-5 Units Subcutaneous Q6H PRN Margarita Mueller, AGACNP-BC   2 Units at 04/10/24 0614    labetaloL injection 10 mg  10 mg Intravenous Q6H PRN Margarita Mueller, AGACNP-BC        LIDOcaine 5 % patch 2 patch  2 patch Transdermal Q24H Margarita Mueller AGACNP-BC   2 patch at 04/16/24 1125    LORazepam tablet 1 mg  1 mg Oral Q6H PRN Rachel, Jeanette, NP        melatonin tablet 6 mg  6 mg Oral Nightly PRN Rachel, Jeanette, NP   6 mg at 04/16/24 2055    methocarbamoL tablet 500 mg  500 mg Oral TID Jeanette Ramos, NP   500 mg at 04/16/24 2055    modafiniL tablet 200 mg  200 mg Oral Daily Jeanette Ramos, NP   200 mg at 04/16/24 0852    multivitamin tablet  1 tablet Oral Daily Jeanette Ramos, NP   1 tablet at 04/16/24 0834    ondansetron injection 4 mg  4 mg Intravenous Q6H PRN Margarita Mueller, AGACNP-BC   4 mg at  "03/27/24 0110    oxyCODONE immediate release tablet 5 mg  5 mg Oral Q4H PRN RachelFrederick parishJeanette, NP        polyethylene glycol packet 17 g  17 g Oral BID Rachel, Jeanette, NP   17 g at 04/16/24 2055    propranoloL tablet 20 mg  20 mg Oral BID Rachel, Jeanette, NP   20 mg at 04/16/24 2055    QUEtiapine tablet 25 mg  25 mg Oral QHS Rachel, Jeanette, NP   25 mg at 04/16/24 2055        Objective:     VITAL SIGNS: 24 HR MIN & MAX LAST   Temp  Min: 97.7 °F (36.5 °C)  Max: 98.5 °F (36.9 °C)  98.1 °F (36.7 °C)   BP  Min: 119/67  Max: 139/81  121/67    Pulse  Min: 77  Max: 130  78    Resp  Min: 17  Max: 18  17    SpO2  Min: 97 %  Max: 100 %  98 %      HT: 5' 5.98" (167.6 cm)  WT: 55.2 kg (121 lb 11.1 oz)  BMI: 19.7     Intake/output:  Intake/Output - Last 3 Shifts         04/15 0700  04/16 0659 04/16 0700  04/17 0659    P.O. 840     Total Intake(mL/kg) 840 (15.2)     Urine (mL/kg/hr) 1301 (1)     Stool 0     Total Output 1301     Net -461           Urine Occurrence  2 x    Stool Occurrence  1 x          No intake or output data in the 24 hours ending 04/17/24 0628        Lines/drains/airway:       Peripheral IV - Single Lumen 04/08/24 2245 20 G Left;Posterior Forearm (Active)   Site Assessment Clean;Dry;Intact 04/08/24 2000   Extremity Assessment Distal to IV No abnormal discoloration 04/08/24 2000   Line Status Capped 04/08/24 2000   Dressing Status Clean;Dry;Intact 04/08/24 2000   Dressing Intervention Integrity maintained 04/08/24 2000   Number of days: 0            NG/OG Tube 03/30/24 0300 16 Fr. Right nostril (Active)   Placement Check placement verified by distal tube length measurement 04/06/24 0400   Distal Tube Length (cm) 60 04/07/24 0800   Tolerance no signs/symptoms of discomfort 04/08/24 2000   Securement secured to nostril center 04/08/24 2000   Clamp Status/Tolerance unclamped;no abdominal discomfort 04/08/24 2000   Suction Setting/Drainage Method suction at the bedside 04/08/24 2000   Insertion Site " "Appearance no redness, warmth, tenderness, skin breakdown, drainage 04/08/24 2000   Drainage None 04/08/24 2000   Flush/Irrigation flushed w/;water 04/08/24 2000   Feeding Type continuous;by pump 04/08/24 2000   Feeding Action feeding continued 04/08/24 2000   Current Rate (mL/hr) 55 mL/hr 04/08/24 2000   Goal Rate (mL/hr) 55 mL/hr 04/08/24 2000   Intake (mL) 474 mL 04/06/24 0525   Water Bolus (mL) 75 mL 04/08/24 2000   Rate Formula Tube Feeding (mL/hr) 55 mL/hr 04/08/24 2000   Formula Name impact peptide 1.5 04/08/24 2000   Intake (mL) - Formula Tube Feeding 535 04/03/24 0559   Number of days: 10       Female External Urinary Catheter w/ Suction 04/07/24 0800 (Active)   Number of days: 1       Physical examination:  Gen: NAD, AAOx2, answering questions appropriately  HEENT: Normocephalic, scalp incision C/D/I. Facial contusions healing.   CV: RRR  Resp: NWOB, R clavicle deformity  Abd: S/NT/ND, last BM 4/11  Msk: moving all extremities spontaneously and purposefully  Neuro: Oriented to self and place, follows commands  Skin/wounds: Warm, dry    Labs:  Renal:  Recent Labs     04/15/24  0429 04/16/24  0900   BUN 12.5 12.8   CREATININE 0.57* 0.68*       No results for input(s): "LACTIC" in the last 72 hours.  FEN/GI:  Recent Labs     04/15/24  0429 04/16/24  0900    142   K 4.6 4.2   CO2 23 24   CALCIUM 8.4* 8.5*   MG 2.20  --    PHOS 2.6  --    ALBUMIN 2.5* 2.6*   BILITOT 0.5 0.5   AST 25 22   ALKPHOS 299* 303*   ALT 65* 55       Heme:  Recent Labs     04/15/24  0429 04/16/24  0900   HGB 9.7* 10.3*   HCT 30.9* 33.4*   * 514*       ID:  Recent Labs     04/15/24  0429 04/16/24  0900   WBC 7.18 6.30       CBG:  Recent Labs     04/15/24  0429 04/16/24  0900   GLUCOSE 106 128*        Recent Labs     04/14/24  1116 04/15/24  0628 04/15/24  1612 04/15/24  2042 04/16/24  0626 04/16/24  1103   POCTGLUCOSE 140* 108 117* 111* 115* 130*      Cardiovascular:  No results for input(s): "TROPONINI", "CKTOTAL", "CKMB", " ""BNP" in the last 168 hours.    I have reviewed all pertinent lab results within the past 24 hours.    Imaging:     I have reviewed all pertinent imaging results/findings within the past 24 hours.    Micro/Path/Other:  Microbiology Results (last 7 days)       ** No results found for the last 168 hours. **           Pathology Results  (Last 7 days)      None             Problems list:  Active Problem List with Overview Notes    Diagnosis Date Noted    Delirium 04/15/2024    Hemopneumothorax on right 04/02/2024    Contusion of right lung 04/02/2024    Adrenal hemorrhage 04/02/2024    Scalp laceration 04/02/2024    Elbow laceration, left, initial encounter 04/02/2024    Acute blood loss anemia 03/29/2024    Acute hypoxemic respiratory failure 03/27/2024    Traumatic subdural hematoma (SDH) 03/27/2024    Aspiration pneumonia due to gastric secretions 03/27/2024    Closed fracture of multiple ribs 03/27/2024    Closed nondisplaced fracture of sternal end of right clavicle 03/27/2024        Assessment & Plan:     SDH, SAH  - s/p emergent craniotomy 3/27/24  - Serial neuro q shift  - Keep HOB > 30 degrees  - Keppra completed 4/2/24  - Promote good sleep-wake cycles  - Provigil qAM  - Seroquel qHS  - Propranolol BID    R 3-9 rib fractures, R PTX/FLIP, R pulmonary contusions  - s/p acute respiratory failure s/t trauma, resolved - extubated 3/29/24  - BiPAP at night prn; has been tolerating RA without difficulty overnight  - Cefepmine x 7 days completed 4/10/24  - Encourage frequent IS  - Good pulmonary hygiene  - Nebs q6h    R clavicle fracture  - Evaluated by Orthopedics, non-op management  - Sling for comfort  - OK for ADLs, no overhead activity  - Pendulum ROM shoulder, full ROM distally    Dysphagia  - Continue working with ST  - Modified diet - Pureed with mildly thick liquids    Auto vs Scooter  - M/Th labs  - MMPC  - PT/OT  - SCDs & Lovenox 40 mg q12h for VTE ppx  - CM following for neuro rehab placement. Medically " stable for placement once bed available.      none

## 2025-05-07 ENCOUNTER — EMERGENCY (EMERGENCY)
Facility: HOSPITAL | Age: 40
LOS: 1 days | End: 2025-05-07
Admitting: EMERGENCY MEDICINE
Payer: SELF-PAY

## 2025-05-07 VITALS
HEART RATE: 78 BPM | OXYGEN SATURATION: 97 % | TEMPERATURE: 98 F | SYSTOLIC BLOOD PRESSURE: 127 MMHG | HEIGHT: 62 IN | RESPIRATION RATE: 18 BRPM | WEIGHT: 190.04 LBS | DIASTOLIC BLOOD PRESSURE: 77 MMHG

## 2025-05-07 VITALS
TEMPERATURE: 99 F | DIASTOLIC BLOOD PRESSURE: 65 MMHG | SYSTOLIC BLOOD PRESSURE: 135 MMHG | OXYGEN SATURATION: 100 % | HEART RATE: 97 BPM | RESPIRATION RATE: 17 BRPM

## 2025-05-07 LAB
HCT VFR BLD CALC: 25.4 % — LOW (ref 34.5–45)
HGB BLD-MCNC: 6.9 G/DL — CRITICAL LOW (ref 11.5–15.5)
MCHC RBC-ENTMCNC: 18.4 PG — LOW (ref 27–34)
MCHC RBC-ENTMCNC: 27.2 G/DL — LOW (ref 32–36)
MCV RBC AUTO: 67.7 FL — LOW (ref 80–100)
NRBC # BLD AUTO: 0.04 K/UL — HIGH (ref 0–0)
NRBC # FLD: 0.04 K/UL — HIGH (ref 0–0)
NRBC BLD AUTO-RTO: 0 /100 WBCS — SIGNIFICANT CHANGE UP (ref 0–0)
PLATELET # BLD AUTO: 412 K/UL — HIGH (ref 150–400)
RBC # BLD: 3.75 M/UL — LOW (ref 3.8–5.2)
RBC # FLD: 31.2 % — HIGH (ref 10.3–14.5)
WBC # BLD: 12.08 K/UL — HIGH (ref 3.8–10.5)
WBC # FLD AUTO: 12.08 K/UL — HIGH (ref 3.8–10.5)

## 2025-05-07 PROCEDURE — 93010 ELECTROCARDIOGRAM REPORT: CPT

## 2025-05-07 PROCEDURE — 99283 EMERGENCY DEPT VISIT LOW MDM: CPT

## 2025-05-08 NOTE — DISCHARGE NOTE NURSING/CASE MANAGEMENT/SOCIAL WORK - NSTRANSFERBELONGINGSDISPO_GEN_A_NUR
Department of Anesthesiology  Postprocedure Note    Patient: Jocelynn Ray  MRN: [id number]  YOB: 1962  Date of evaluation: 5/8/2025    Procedure Summary       Date: 05/08/25 Room / Location: [address]    Anesthesia Start: 0855 Anesthesia Stop: 1129    Procedure: COSMETIC BILATERAL BRACHIOPLASTY WITH LIPOSUCTION (Bilateral: Arm Upper) Diagnosis:       Encounter for cosmetic surgery      (Encounter for cosmetic surgery [Z41.1])    Surgeons: ROBIN Mason MD Responsible Provider: Eric Jones MD    Anesthesia Type: general ASA Status: 2            Anesthesia Type: No value filed.    Kimber Phase I: Kimber Score: 10    Kimber Phase II:      Anesthesia Post Evaluation    Patient location during evaluation: PACU  Patient participation: complete - patient participated  Level of consciousness: awake and alert  Airway patency: patent  Nausea & Vomiting: no nausea and no vomiting  Cardiovascular status: hemodynamically stable  Respiratory status: room air and spontaneous ventilation  Hydration status: euvolemic  Multimodal analgesia pain management approach  Pain management: adequate    No notable events documented.  
with patient

## 2025-05-10 ENCOUNTER — EMERGENCY (EMERGENCY)
Facility: HOSPITAL | Age: 40
LOS: 1 days | End: 2025-05-10
Attending: EMERGENCY MEDICINE | Admitting: EMERGENCY MEDICINE
Payer: SELF-PAY

## 2025-05-10 VITALS
HEART RATE: 98 BPM | RESPIRATION RATE: 98 BRPM | WEIGHT: 190.04 LBS | OXYGEN SATURATION: 98 % | DIASTOLIC BLOOD PRESSURE: 62 MMHG | SYSTOLIC BLOOD PRESSURE: 120 MMHG | HEIGHT: 62 IN

## 2025-05-10 VITALS
DIASTOLIC BLOOD PRESSURE: 66 MMHG | SYSTOLIC BLOOD PRESSURE: 108 MMHG | RESPIRATION RATE: 18 BRPM | OXYGEN SATURATION: 100 % | TEMPERATURE: 98 F | HEART RATE: 69 BPM

## 2025-05-10 LAB
ALBUMIN SERPL ELPH-MCNC: 4 G/DL — SIGNIFICANT CHANGE UP (ref 3.3–5)
ALP SERPL-CCNC: 64 U/L — SIGNIFICANT CHANGE UP (ref 40–120)
ALT FLD-CCNC: 9 U/L — SIGNIFICANT CHANGE UP (ref 4–33)
ANION GAP SERPL CALC-SCNC: 13 MMOL/L — SIGNIFICANT CHANGE UP (ref 7–14)
ANISOCYTOSIS BLD QL: SIGNIFICANT CHANGE UP
APTT BLD: 31.5 SEC — SIGNIFICANT CHANGE UP (ref 26.1–36.8)
AST SERPL-CCNC: 13 U/L — SIGNIFICANT CHANGE UP (ref 4–32)
BASOPHILS # BLD AUTO: 0 K/UL — SIGNIFICANT CHANGE UP (ref 0–0.2)
BASOPHILS NFR BLD AUTO: 0 % — SIGNIFICANT CHANGE UP (ref 0–2)
BILIRUB SERPL-MCNC: 0.3 MG/DL — SIGNIFICANT CHANGE UP (ref 0.2–1.2)
BLD GP AB SCN SERPL QL: NEGATIVE — SIGNIFICANT CHANGE UP
BUN SERPL-MCNC: 10 MG/DL — SIGNIFICANT CHANGE UP (ref 7–23)
BURR CELLS BLD QL SMEAR: SLIGHT — SIGNIFICANT CHANGE UP
CALCIUM SERPL-MCNC: 9.2 MG/DL — SIGNIFICANT CHANGE UP (ref 8.4–10.5)
CHLORIDE SERPL-SCNC: 101 MMOL/L — SIGNIFICANT CHANGE UP (ref 98–107)
CO2 SERPL-SCNC: 20 MMOL/L — LOW (ref 22–31)
CREAT SERPL-MCNC: 0.85 MG/DL — SIGNIFICANT CHANGE UP (ref 0.5–1.3)
EGFR: 89 ML/MIN/1.73M2 — SIGNIFICANT CHANGE UP
EGFR: 89 ML/MIN/1.73M2 — SIGNIFICANT CHANGE UP
EOSINOPHIL # BLD AUTO: 0.28 K/UL — SIGNIFICANT CHANGE UP (ref 0–0.5)
EOSINOPHIL NFR BLD AUTO: 2.6 % — SIGNIFICANT CHANGE UP (ref 0–6)
FLUAV AG NPH QL: SIGNIFICANT CHANGE UP
FLUBV AG NPH QL: SIGNIFICANT CHANGE UP
GIANT PLATELETS BLD QL SMEAR: PRESENT — SIGNIFICANT CHANGE UP
GLUCOSE SERPL-MCNC: 101 MG/DL — HIGH (ref 70–99)
HCG SERPL-ACNC: <1 MIU/ML — SIGNIFICANT CHANGE UP
HCT VFR BLD CALC: 25.9 % — LOW (ref 34.5–45)
HGB BLD-MCNC: 7.1 G/DL — LOW (ref 11.5–15.5)
IANC: 7.37 K/UL — SIGNIFICANT CHANGE UP (ref 1.8–7.4)
INR BLD: 1.2 RATIO — HIGH (ref 0.85–1.16)
LYMPHOCYTES # BLD AUTO: 1.79 K/UL — SIGNIFICANT CHANGE UP (ref 1–3.3)
LYMPHOCYTES # BLD AUTO: 16.7 % — SIGNIFICANT CHANGE UP (ref 13–44)
MACROCYTES BLD QL: SLIGHT — SIGNIFICANT CHANGE UP
MANUAL SMEAR VERIFICATION: SIGNIFICANT CHANGE UP
MCHC RBC-ENTMCNC: 18.5 PG — LOW (ref 27–34)
MCHC RBC-ENTMCNC: 27.4 G/DL — LOW (ref 32–36)
MCV RBC AUTO: 67.6 FL — LOW (ref 80–100)
MICROCYTES BLD QL: SIGNIFICANT CHANGE UP
MONOCYTES # BLD AUTO: 0.57 K/UL — SIGNIFICANT CHANGE UP (ref 0–0.9)
MONOCYTES NFR BLD AUTO: 5.3 % — SIGNIFICANT CHANGE UP (ref 2–14)
NEUTROPHILS # BLD AUTO: 7.82 K/UL — HIGH (ref 1.8–7.4)
NEUTROPHILS NFR BLD AUTO: 72.8 % — SIGNIFICANT CHANGE UP (ref 43–77)
PLAT MORPH BLD: NORMAL — SIGNIFICANT CHANGE UP
PLATELET # BLD AUTO: 488 K/UL — HIGH (ref 150–400)
PLATELET COUNT - ESTIMATE: ABNORMAL
POIKILOCYTOSIS BLD QL AUTO: SLIGHT — SIGNIFICANT CHANGE UP
POLYCHROMASIA BLD QL SMEAR: SLIGHT — SIGNIFICANT CHANGE UP
POTASSIUM SERPL-MCNC: 4.1 MMOL/L — SIGNIFICANT CHANGE UP (ref 3.5–5.3)
POTASSIUM SERPL-SCNC: 4.1 MMOL/L — SIGNIFICANT CHANGE UP (ref 3.5–5.3)
PROT SERPL-MCNC: 8.4 G/DL — HIGH (ref 6–8.3)
PROTHROM AB SERPL-ACNC: 13.9 SEC — HIGH (ref 9.9–13.4)
RBC # BLD: 3.83 M/UL — SIGNIFICANT CHANGE UP (ref 3.8–5.2)
RBC # FLD: 31.9 % — HIGH (ref 10.3–14.5)
RBC BLD AUTO: ABNORMAL
RH IG SCN BLD-IMP: POSITIVE — SIGNIFICANT CHANGE UP
RSV RNA NPH QL NAA+NON-PROBE: SIGNIFICANT CHANGE UP
SARS-COV-2 RNA SPEC QL NAA+PROBE: SIGNIFICANT CHANGE UP
SODIUM SERPL-SCNC: 134 MMOL/L — LOW (ref 135–145)
SOURCE RESPIRATORY: SIGNIFICANT CHANGE UP
TROPONIN T, HIGH SENSITIVITY RESULT: <6 NG/L — SIGNIFICANT CHANGE UP
VARIANT LYMPHS # BLD: 2.6 % — SIGNIFICANT CHANGE UP (ref 0–6)
VARIANT LYMPHS NFR BLD MANUAL: 2.6 % — SIGNIFICANT CHANGE UP (ref 0–6)
WBC # BLD: 10.74 K/UL — HIGH (ref 3.8–10.5)
WBC # FLD AUTO: 10.74 K/UL — HIGH (ref 3.8–10.5)

## 2025-05-10 PROCEDURE — 71275 CT ANGIOGRAPHY CHEST: CPT | Mod: 26

## 2025-05-10 PROCEDURE — 74177 CT ABD & PELVIS W/CONTRAST: CPT | Mod: 26

## 2025-05-10 PROCEDURE — 99223 1ST HOSP IP/OBS HIGH 75: CPT

## 2025-05-10 PROCEDURE — 71045 X-RAY EXAM CHEST 1 VIEW: CPT | Mod: 26

## 2025-05-10 PROCEDURE — 93010 ELECTROCARDIOGRAM REPORT: CPT

## 2025-05-10 RX ORDER — ACETAMINOPHEN 500 MG/5ML
1000 LIQUID (ML) ORAL ONCE
Refills: 0 | Status: COMPLETED | OUTPATIENT
Start: 2025-05-10 | End: 2025-05-10

## 2025-05-10 RX ADMIN — Medication 400 MILLIGRAM(S): at 09:37

## 2025-05-10 NOTE — ED CDU PROVIDER DISPOSITION NOTE - ATTENDING CONTRIBUTION TO CARE
I have made the decision to discharge this patient to the CDU as documented in my Provider note I have reviewed the note  written by the CDU Physician Assistant, on that visit day. I have supervised and participated as necessary in the performance of procedures indicated for patient management and was available at all phases of the patient´s visit when needed.    Patient received transfusion w/o incident s/s improved requesting d/c has followup in place and can   RTED PRN  TBDCed by PA
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
## 2025-05-10 NOTE — ED CDU PROVIDER INITIAL DAY NOTE - CPE EDP NEURO NORM
normal... Sudden numbness or weakness of the face, arm, or leg, especially on one side of the body. Confusion, trouble speaking or understanding. Trouble seeing in one or both eyes. Trouble walking, dizziness, loss of balance or coordination. Severe headache.

## 2025-05-10 NOTE — ED PROVIDER NOTE - CARE PLAN
Principal Discharge DX:	SOB (shortness of breath)   1 Principal Discharge DX:	SOB (shortness of breath)  Secondary Diagnosis:	Anemia

## 2025-05-10 NOTE — ED CDU PROVIDER DISPOSITION NOTE - NSFOLLOWUPINSTRUCTIONS_ED_ALL_ED_FT
Follow up with your PMD/Hematologist within 1-2 days or you can call our clinic at [phone] for an appointment.    Take all of your other medications as previously prescribed.    Worsening, continued or ANY new concerning symptoms return to the Emergency Department.      Anemia    WHAT YOU NEED TO KNOW:    What is anemia? Anemia is a low number of red blood cells or a low amount of hemoglobin in your red blood cells. Hemoglobin is a protein that helps carry oxygen throughout your body. Red blood cells use iron to create hemoglobin. Anemia may develop if your body does not have enough iron. It may also develop if your body does not make enough red blood cells or they die faster than your body can make them.    What increases my risk for anemia?    Trauma or surgery that causes massive blood loss    A gastrointestinal bleed    A woman's monthly period    A family history of blood disease or anemia    Liver or kidney disease, cancer, rheumatoid arthritis, or hyperthyroidism    Alcohol abuse    Lack of foods that contain iron, folic acid, or vitamin B12  What are the signs and symptoms of anemia?    Chest pain or a fast heartbeat    Lightheadedness, dizziness, or shortness of breath    Cold or pale skin    Tiredness, weakness, or confusion  How is anemia diagnosed? Blood tests will show if you have anemia.    How is anemia treated? Treatment depends on the type of anemia you have. You may need any of the following:    Iron or folic acid supplements help increase your red blood cell and hemoglobin levels.    Vitamin B12 injections may help boost your red blood cell count and decrease your symptoms.    A blood transfusion may be needed if your body cannot replace the blood you have lost.    Surgery may be needed to stop bleeding, or if your anemia is severe.  How can I prevent anemia? Eat healthy foods rich in iron and vitamin C. Nuts, meat, dark leafy green vegetables, and beans are high in iron and protein. Vitamin C helps your body absorb iron. Foods rich in vitamin C include oranges and other citrus fruits. Ask your healthcare provider for a list of other foods that are high in iron or vitamin C. Ask if you need to be on a special diet.  Sources of Iron  Sources of Vitamin C    Call your local emergency number (911 in the [place]), or have someone call if:    You lose consciousness.    You have severe chest pain.  When should I seek immediate care?    You have dark or bloody bowel movements.    When should I call my doctor?    Your symptoms are worse, even after treatment.    You have questions or concerns about your condition or care

## 2025-05-10 NOTE — ED PROVIDER NOTE - PROGRESS NOTE DETAILS
Would you be willing to order these labs for follow up appt?    Jeramie Hinton RN     CT no pulmonary embolism.  Enlarged myomatous uterus with largest lesion increased in size.  Measuring 10.4 compared to previous 7.7 cm.  Patient states she is bleeding very lightly.  No bowel obstruction appendix is normal hemoglobin is 7.1/25 not significant drop from her baseline of 8.1/27 but patient is endorsing shortness of breath.  Will consent for a transfusion.  Troponin is less than 6.  hCG negative. pt consented for transfusion. plan for cdu pt agrees.   pt has known  fibroids due to follow up with her GYN at end of month. Raji Mccann MD PGY-3: discussed CT findings with patient including fibroids larger than before. PT has OBGYN and appointment end of the month will follow up. Agrees with plan for CDU.

## 2025-05-10 NOTE — ED ADULT NURSE NOTE - SUICIDE SCREENING QUESTION 3
No
Plan: Initiated on iPledge & waiting 30 day lockout.
Continue Regimen: BCP
Initiate Treatment: Bactrim  mg-160 mg tablet: x1 month
Detail Level: Zone
Otc Regimen: Dr. Carrie Gracia \\n\\nSunscreen, Moisturizer

## 2025-05-10 NOTE — ED CDU PROVIDER DISPOSITION NOTE - PATIENT PORTAL LINK FT
You can access the FollowMyHealth Patient Portal offered by [facility] by registering at the following website: [email or web address]. By joining [facility]’s FollowMyHealth portal, you will also be able to view your health information using other applications (apps) compatible with our system.
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
## 2025-08-16 ENCOUNTER — EMERGENCY (EMERGENCY)
Facility: HOSPITAL | Age: 40
LOS: 0 days | Discharge: HOME | End: 2025-08-16
Attending: EMERGENCY MEDICINE
Payer: COMMERCIAL

## 2025-08-16 VITALS
OXYGEN SATURATION: 98 % | RESPIRATION RATE: 16 BRPM | HEART RATE: 84 BPM | WEIGHT: 199.96 LBS | SYSTOLIC BLOOD PRESSURE: 148 MMHG | DIASTOLIC BLOOD PRESSURE: 82 MMHG | HEIGHT: 62 IN | TEMPERATURE: 98 F

## 2025-08-16 VITALS
OXYGEN SATURATION: 98 % | SYSTOLIC BLOOD PRESSURE: 117 MMHG | RESPIRATION RATE: 16 BRPM | TEMPERATURE: 98 F | HEART RATE: 78 BPM | DIASTOLIC BLOOD PRESSURE: 74 MMHG

## 2025-08-16 DIAGNOSIS — M43.6 TORTICOLLIS: ICD-10-CM
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
## 2025-08-16 DIAGNOSIS — M25.511 PAIN IN RIGHT SHOULDER: ICD-10-CM

## 2025-08-16 DIAGNOSIS — M25.611 STIFFNESS OF RIGHT SHOULDER, NOT ELSEWHERE CLASSIFIED: ICD-10-CM
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
## 2025-08-16 PROCEDURE — 73030 X-RAY EXAM OF SHOULDER: CPT | Mod: 26,RT

## 2025-08-16 PROCEDURE — 72125 CT NECK SPINE W/O DYE: CPT | Mod: 26

## 2025-08-16 PROCEDURE — 99285 EMERGENCY DEPT VISIT HI MDM: CPT

## 2025-08-16 PROCEDURE — 73060 X-RAY EXAM OF HUMERUS: CPT | Mod: 26,RT

## 2025-08-16 RX ORDER — IBUPROFEN 200 MG
600 TABLET ORAL ONCE
Refills: 0 | Status: COMPLETED | OUTPATIENT
Start: 2025-08-16 | End: 2025-08-16

## 2025-08-16 RX ORDER — TRAMADOL HYDROCHLORIDE 50 MG/1
50 TABLET, FILM COATED ORAL ONCE
Refills: 0 | Status: DISCONTINUED | OUTPATIENT
Start: 2025-08-16 | End: 2025-08-16

## 2025-08-16 RX ORDER — METHOCARBAMOL 500 MG/1
1500 TABLET, FILM COATED ORAL ONCE
Refills: 0 | Status: COMPLETED | OUTPATIENT
Start: 2025-08-16 | End: 2025-08-16

## 2025-08-16 RX ADMIN — Medication 600 MILLIGRAM(S): at 17:31

## 2025-08-16 RX ADMIN — METHOCARBAMOL 1500 MILLIGRAM(S): 500 TABLET, FILM COATED ORAL at 17:31

## 2025-08-16 RX ADMIN — TRAMADOL HYDROCHLORIDE 50 MILLIGRAM(S): 50 TABLET, FILM COATED ORAL at 17:31
